# Patient Record
Sex: MALE | Race: WHITE | NOT HISPANIC OR LATINO | Employment: FULL TIME | URBAN - METROPOLITAN AREA
[De-identification: names, ages, dates, MRNs, and addresses within clinical notes are randomized per-mention and may not be internally consistent; named-entity substitution may affect disease eponyms.]

---

## 2019-01-20 ENCOUNTER — APPOINTMENT (EMERGENCY)
Dept: RADIOLOGY | Facility: HOSPITAL | Age: 54
End: 2019-01-20
Payer: COMMERCIAL

## 2019-01-20 ENCOUNTER — HOSPITAL ENCOUNTER (EMERGENCY)
Facility: HOSPITAL | Age: 54
Discharge: HOME | End: 2019-01-20
Attending: EMERGENCY MEDICINE
Payer: COMMERCIAL

## 2019-01-20 VITALS
HEART RATE: 73 BPM | TEMPERATURE: 98.2 F | WEIGHT: 216.49 LBS | RESPIRATION RATE: 18 BRPM | DIASTOLIC BLOOD PRESSURE: 72 MMHG | OXYGEN SATURATION: 96 % | SYSTOLIC BLOOD PRESSURE: 129 MMHG | BODY MASS INDEX: 29.36 KG/M2

## 2019-01-20 DIAGNOSIS — R05.9 COUGH: ICD-10-CM

## 2019-01-20 DIAGNOSIS — R06.2 WHEEZING: Primary | ICD-10-CM

## 2019-01-20 PROCEDURE — 99283 EMERGENCY DEPT VISIT LOW MDM: CPT

## 2019-01-20 PROCEDURE — 71046 X-RAY EXAM CHEST 2 VIEWS: CPT

## 2019-01-20 PROCEDURE — 94640 AIRWAY INHALATION TREATMENT: CPT

## 2019-01-20 PROCEDURE — 96374 THER/PROPH/DIAG INJ IV PUSH: CPT

## 2019-01-20 RX ORDER — LEVOTHYROXINE SODIUM 0.12 MG/1
125 TABLET ORAL DAILY
COMMUNITY

## 2019-01-20 RX ORDER — BENZONATATE 100 MG/1
100 CAPSULE ORAL ONCE
Status: COMPLETED | OUTPATIENT
Start: 2019-01-20 | End: 2019-01-20

## 2019-01-20 RX ORDER — HYDROXYCHLOROQUINE SULFATE 200 MG/1
200 TABLET, FILM COATED ORAL 2 TIMES DAILY
COMMUNITY

## 2019-01-20 RX ORDER — PREDNISONE 20 MG/1
TABLET ORAL DAILY
COMMUNITY
End: 2019-01-26

## 2019-01-20 RX ORDER — LEVOCETIRIZINE DIHYDROCHLORIDE 5 MG/1
5 TABLET, FILM COATED ORAL
COMMUNITY
Start: 2018-06-21 | End: 2019-06-21

## 2019-01-20 RX ORDER — BUDESONIDE AND FORMOTEROL FUMARATE DIHYDRATE 160; 4.5 UG/1; UG/1
2 AEROSOL RESPIRATORY (INHALATION) 2 TIMES DAILY
COMMUNITY
End: 2019-01-20

## 2019-01-20 RX ORDER — BENZONATATE 100 MG/1
100 CAPSULE ORAL EVERY 8 HOURS
Qty: 21 CAPSULE | Refills: 0 | Status: SHIPPED | OUTPATIENT
Start: 2019-01-20

## 2019-01-20 RX ORDER — METHYLPREDNISOLONE SODIUM SUCCINATE 125 MG/2ML
125 INJECTION, POWDER, LYOPHILIZED, FOR SOLUTION INTRAMUSCULAR; INTRAVENOUS ONCE
Status: COMPLETED | OUTPATIENT
Start: 2019-01-20 | End: 2019-01-20

## 2019-01-20 RX ORDER — IPRATROPIUM BROMIDE AND ALBUTEROL SULFATE 2.5; .5 MG/3ML; MG/3ML
3 SOLUTION RESPIRATORY (INHALATION) ONCE
Status: COMPLETED | OUTPATIENT
Start: 2019-01-20 | End: 2019-01-20

## 2019-01-20 RX ORDER — MONTELUKAST SODIUM 10 MG/1
10 TABLET ORAL
COMMUNITY

## 2019-01-20 RX ORDER — DOXYCYCLINE HYCLATE 100 MG/1
100 CAPSULE ORAL EVERY 12 HOURS SCHEDULED
COMMUNITY
End: 2019-01-26

## 2019-01-20 RX ORDER — ALBUTEROL SULFATE 90 UG/1
2 AEROSOL, METERED RESPIRATORY (INHALATION) EVERY 6 HOURS PRN
COMMUNITY

## 2019-01-20 RX ADMIN — IPRATROPIUM BROMIDE AND ALBUTEROL SULFATE 3 ML: 2.5; .5 SOLUTION RESPIRATORY (INHALATION) at 09:34

## 2019-01-20 RX ADMIN — BENZONATATE 100 MG: 100 CAPSULE ORAL at 09:34

## 2019-01-20 RX ADMIN — METHYLPREDNISOLONE SODIUM SUCCINATE 125 MG: 125 INJECTION, POWDER, FOR SOLUTION INTRAMUSCULAR; INTRAVENOUS at 09:38

## 2019-01-20 NOTE — ED PROVIDER NOTES
History  Chief Complaint   Patient presents with    URI     URI symptoms X4  weeks seen at urgent given antibiotics and inhaler, feels no better       History provided by:  Patient   used: No    Wheezing   Severity:  Mild  Severity compared to prior episodes:  Similar  Onset quality:  Gradual  Duration:  3 weeks  Timing:  Intermittent  Progression:  Waxing and waning  Chronicity:  New  Context comment:  Sick contacts, URI, dry heat  Relieved by:  Nothing  Worsened by:  Nothing  Ineffective treatments:  Beta-agonist inhaler  Associated symptoms: cough and sputum production    Associated symptoms: no chest pain, no chest tightness, no fatigue, no fever, no rash, no shortness of breath and no sore throat        Prior to Admission Medications   Prescriptions Last Dose Informant Patient Reported? Taking? albuterol (PROVENTIL HFA,VENTOLIN HFA) 90 mcg/act inhaler   Yes Yes   Sig: Inhale 2 puffs every 6 (six) hours as needed for wheezing   budesonide-formoterol (SYMBICORT) 160-4 5 mcg/act inhaler   Yes Yes   Sig: Inhale 2 puffs 2 (two) times a day Rinse mouth after use  doxycycline hyclate (VIBRAMYCIN) 100 mg capsule   Yes Yes   Sig: Take 100 mg by mouth every 12 (twelve) hours   hydroxychloroquine (PLAQUENIL) 200 mg tablet   Yes Yes   Sig: Take 200 mg by mouth 2 (two) times a day   levocetirizine (XYZAL) 5 MG tablet   Yes Yes   Sig: Take 5 mg by mouth daily at bedtime as needed   levothyroxine 125 mcg tablet   Yes Yes   Sig: Take 125 mcg by mouth daily   montelukast (SINGULAIR) 10 mg tablet   Yes Yes   Sig: Take 10 mg by mouth daily at bedtime   predniSONE 20 mg tablet   Yes Yes   Sig: Take by mouth daily      Facility-Administered Medications: None       Past Medical History:   Diagnosis Date    Disease of thyroid gland     graves disease       History reviewed  No pertinent surgical history  History reviewed  No pertinent family history    I have reviewed and agree with the history as documented  Social History   Substance Use Topics    Smoking status: Former Smoker    Smokeless tobacco: Never Used    Alcohol use No        Review of Systems   Constitutional: Negative for activity change, appetite change, chills, fatigue and fever  HENT: Negative for congestion, dental problem, facial swelling, sore throat, tinnitus and trouble swallowing  Eyes: Negative for pain, discharge and itching  Respiratory: Positive for cough, sputum production and wheezing  Negative for apnea, chest tightness and shortness of breath  Cardiovascular: Negative for chest pain, palpitations and leg swelling  Gastrointestinal: Negative for abdominal pain and nausea  Genitourinary: Negative for difficulty urinating, dysuria and flank pain  Musculoskeletal: Negative for arthralgias, back pain, gait problem, joint swelling and neck pain  Skin: Negative for color change, rash and wound  Neurological: Negative for dizziness and facial asymmetry  Psychiatric/Behavioral: Negative for agitation and behavioral problems  The patient is not nervous/anxious  All other systems reviewed and are negative  Physical Exam  Physical Exam   Constitutional: He is oriented to person, place, and time  He appears well-developed and well-nourished  No distress  HENT:   Head: Normocephalic and atraumatic  Right Ear: External ear normal    Left Ear: External ear normal    Eyes: Pupils are equal, round, and reactive to light  EOM are normal  Right eye exhibits no discharge  Left eye exhibits no discharge  Neck: Normal range of motion  Neck supple  No tracheal deviation present  No thyromegaly present  Cardiovascular: Normal rate and regular rhythm  No murmur heard  Pulmonary/Chest: Effort normal  He has wheezes  Abdominal: Soft  Bowel sounds are normal  He exhibits no distension  There is no tenderness  Musculoskeletal: Normal range of motion  He exhibits no edema or deformity     Neurological: He is alert and oriented to person, place, and time  No cranial nerve deficit  He exhibits normal muscle tone  Skin: Skin is warm  Capillary refill takes less than 2 seconds  He is not diaphoretic  Psychiatric: He has a normal mood and affect  His behavior is normal    Nursing note and vitals reviewed  Vital Signs  ED Triage Vitals   Temperature Pulse Respirations Blood Pressure SpO2   01/20/19 0915 01/20/19 0913 01/20/19 0913 01/20/19 0913 01/20/19 0913   98 2 °F (36 8 °C) 81 20 126/71 97 %      Temp Source Heart Rate Source Patient Position - Orthostatic VS BP Location FiO2 (%)   01/20/19 0915 01/20/19 1028 -- 01/20/19 1028 --   Oral Monitor  Right arm       Pain Score       01/20/19 0913       5           Vitals:    01/20/19 0913 01/20/19 1028   BP: 126/71 129/72   Pulse: 81 73       Visual Acuity      ED Medications  Medications   methylPREDNISolone sodium succinate (Solu-MEDROL) injection 125 mg (125 mg Intravenous Given 1/20/19 0938)   ipratropium-albuterol (DUO-NEB) 0 5-2 5 mg/3 mL inhalation solution 3 mL (3 mL Nebulization Given 1/20/19 0934)   benzonatate (TESSALON PERLES) capsule 100 mg (100 mg Oral Given 1/20/19 8911)       Diagnostic Studies  Results Reviewed     None                 XR chest 2 views   Final Result by Enedelia Varela MD (01/20 1003)      Tiny focus of disease at the left base  If the patient is to be treated for pneumonia, a follow-up two-view chest should be obtained in 6 weeks to ensure resolution of this abnormality  If the clinical scenario is not in keeping with pneumonia, further evaluation should be performed            Workstation performed: [id number]                    Procedures  Procedures       Phone Contacts  ED Phone Contact    ED Course                               MDM  Number of Diagnoses or Management Options  Cough:   Wheezing:   Diagnosis management comments: Patient with 3-4 weeks cough, wheezing    Remote history of smoking greater than 35 years ago   History of asthma, no chronic medications  Seen at urgent care 1 week ago prescribed prednisone, doxycycline, albuterol, Symbicort  Initially improved has now worsened  Continues to have cough daily, intermittently productive  Denies any fevers, chills, chest pain  Has a coal heater at home  Sick contacts at work  Vital signs stable  Patient speaking full sentences, no acute distress  Diffuse wheezing on lung exam   No focal lung abnormalities  Due to persistent cough in setting of previous smoking history in productive cough will obtain chest x-ray  Due to diffuse wheezing and cough will treat with Solu-Medrol and DuoNeb treatment  No PE risk factors  No chest pain  No cardiac workup indicated  Patient re-evaluated at 20   Feels better after treatment  Mild end-expiratory wheezing  Chest x-ray with tiny left-sided pneumonia  Fits with clinical picture  Will continue doxycycline treatment  Feel much of cough is related to wheezing  Will switch from Symbicort to Advair  Will prescribe Tessalon Perles for cough  Will continue prednisone, albuterol p r n       Discussed with patient need to follow up with primary care physician for repeat x-ray in 4-6 weeks on possible referral to pulmonologist for formal pulmonary function test        Amount and/or Complexity of Data Reviewed  Tests in the radiology section of CPT®: ordered and reviewed  Independent visualization of images, tracings, or specimens: yes    Risk of Complications, Morbidity, and/or Mortality  Presenting problems: moderate  Diagnostic procedures: moderate  Management options: moderate    Patient Progress  Patient progress: stable    CritCare Time    Disposition  Final diagnoses:   Wheezing   Cough     Time reflects when diagnosis was documented in both MDM as applicable and the Disposition within this note     Time User Action Codes Description Comment    1/20/2019 10:14 AM Scot Partida Add [R06 2] Wheezing 1/20/2019 10:14 AM Panda Garzon Add [R05] Cough       ED Disposition     ED Disposition Condition Comment    Discharge  Elmo Rough discharge to home/self care  Condition at discharge: Good        Follow-up Information     Follow up With Specialties Details Why Contact Info    Kimberly Beck MD Family Medicine In 1 week To ensure improvement, possible pulmonary referral   Need repeat chest xray in 2-3 weeks  [address]  [phone]            Discharge Medication List as of 1/20/2019 10:18 AM      START taking these medications    Details   benzonatate (TESSALON PERLES) 100 mg capsule Take 1 capsule (100 mg total) by mouth every 8 (eight) hours, Starting Sun 1/20/2019, Print      fluticasone-salmeterol (ADVAIR) 250-50 mcg/dose inhaler Inhale 1 puff 2 (two) times a day for 14 days Rinse mouth after use , Starting Sun 1/20/2019, Until Sun 2/3/2019, Print         CONTINUE these medications which have NOT CHANGED    Details   albuterol (PROVENTIL HFA,VENTOLIN HFA) 90 mcg/act inhaler Inhale 2 puffs every 6 (six) hours as needed for wheezing, Historical Med      doxycycline hyclate (VIBRAMYCIN) 100 mg capsule Take 100 mg by mouth every 12 (twelve) hours, Historical Med      hydroxychloroquine (PLAQUENIL) 200 mg tablet Take 200 mg by mouth 2 (two) times a day, Historical Med      levocetirizine (XYZAL) 5 MG tablet Take 5 mg by mouth daily at bedtime as needed, Starting Thu 6/21/2018, Until Fri 6/21/2019, Historical Med      levothyroxine 125 mcg tablet Take 125 mcg by mouth daily, Historical Med      montelukast (SINGULAIR) 10 mg tablet Take 10 mg by mouth daily at bedtime, Historical Med      predniSONE 20 mg tablet Take by mouth daily, Historical Med         STOP taking these medications       budesonide-formoterol (SYMBICORT) 160-4 5 mcg/act inhaler Comments:   Reason for Stopping:             No discharge procedures on file      ED Provider  Electronically Signed by           Jayleen Silva Vaughn Newton MD  01/20/19 621 [facility] Vaughn Newton MD  01/20/19 0739

## 2019-01-20 NOTE — DISCHARGE INSTRUCTIONS
Please continue to take prednisone as prescribed for wheezing  Please finish you prescription for doxycycline  Wheezing   WHAT YOU NEED TO KNOW:   Wheezing happens when air flows through a narrowed airway  Wheezing can happen when you breathe in, breathe out, or both  Wheezes may sound like a whistle, squeal, groan, or creak  Wheezes may also sound musical or high-pitched  Wheezing cannot be stopped by coughing  Asthma, allergies, or infection are the most common causes of wheezing  A foreign body, asthma, extra mucus, or smoking can also cause wheezing  DISCHARGE INSTRUCTIONS:   Call 911 if:   · You have sudden trouble breathing  · Your throat feels like it is swelling or feels tight  · You are dizzy, lightheaded, confused, or feel faint  · You have chest pain or tightness  Return to the emergency department if:   · You have shortness of breath  · You are coughing up blood  · You have chest pain  Contact your healthcare provider if:   · You have a fever  · Your wheezing does not get better or it gets worse  · You have questions or concerns about your condition or care  Medicines:   · Medicines  decrease inflammation, open airways, and make it easier to breathe  · Take your medicine as directed  Contact your healthcare provider if you think your medicine is not helping or if you have side effects  Tell him of her if you are allergic to any medicine  Keep a list of the medicines, vitamins, and herbs you take  Include the amounts, and when and why you take them  Bring the list or the pill bottles to follow-up visits  Carry your medicine list with you in case of an emergency  Follow up with your healthcare provider as directed: You may be referred to a specialist  Write down your questions so you remember to ask them during your visits  Manage your symptoms:   · Avoid allergy triggers , such as animals, grass, pollen, or dust     · Return to your usual activity as directed  You may need to limit certain activities until you follow up with your healthcare provider or your symptoms improve  Your child may need to avoid sports until his symptoms improve  · Take deep breaths and cough several times a day  This will decrease your risk for a lung infection and help decrease wheezing  Take a deep breath and hold it for as long as you can  Let the air out and then cough strongly  Deep breaths help open your airway  You may be given an incentive spirometer to help you take deep breaths  Put the plastic piece in your mouth and take a slow, deep breath, then let the air out and cough  Repeat these steps 10 times every hour  · Drink liquids as directed  You may need to drink more liquids than usual to thin your mucus and prevent dehydration  Ask how much liquid to drink each day and which liquids are best for you  © 2017 [address] Information is for End User's use only and may not be sold, redistributed or otherwise used for commercial purposes  All illustrations and images included in CareNotes® are the copyrighted property of [employer] M [employer]  or John Rivera  The above information is an  only  It is not intended as medical advice for individual conditions or treatments  Talk to your doctor, nurse or pharmacist before following any medical regimen to see if it is safe and effective for you

## 2019-01-25 ENCOUNTER — HOSPITAL ENCOUNTER (EMERGENCY)
Facility: HOSPITAL | Age: 54
Discharge: HOME | End: 2019-01-26
Attending: EMERGENCY MEDICINE | Admitting: EMERGENCY MEDICINE
Payer: COMMERCIAL

## 2019-01-25 DIAGNOSIS — K57.92 DIVERTICULITIS: Primary | ICD-10-CM

## 2019-01-25 LAB
BASOPHILS # BLD AUTO: 0.02 THOUSANDS/ΜL (ref 0–0.1)
BASOPHILS NFR BLD AUTO: 0 % (ref 0–1)
BILIRUB UR QL STRIP: NEGATIVE
CLARITY UR: CLEAR
COLOR UR: YELLOW
EOSINOPHIL # BLD AUTO: 0.22 THOUSAND/ΜL (ref 0–0.61)
EOSINOPHIL NFR BLD AUTO: 2 % (ref 0–6)
ERYTHROCYTE [DISTWIDTH] IN BLOOD BY AUTOMATED COUNT: 12.1 % (ref 11.6–15.1)
GLUCOSE UR STRIP-MCNC: NEGATIVE MG/DL
HCT VFR BLD AUTO: 42 % (ref 36.5–49.3)
HGB BLD-MCNC: 14 G/DL (ref 12–17)
HGB UR QL STRIP.AUTO: ABNORMAL
IMM GRANULOCYTES # BLD AUTO: 0.05 THOUSAND/UL (ref 0–0.2)
IMM GRANULOCYTES NFR BLD AUTO: 0 % (ref 0–2)
KETONES UR STRIP-MCNC: NEGATIVE MG/DL
LEUKOCYTE ESTERASE UR QL STRIP: NEGATIVE
LYMPHOCYTES # BLD AUTO: 1.33 THOUSANDS/ΜL (ref 0.6–4.47)
LYMPHOCYTES NFR BLD AUTO: 11 % (ref 14–44)
MCH RBC QN AUTO: 30.2 PG (ref 26.8–34.3)
MCHC RBC AUTO-ENTMCNC: 33.3 G/DL (ref 31.4–37.4)
MCV RBC AUTO: 91 FL (ref 82–98)
MONOCYTES # BLD AUTO: 1 THOUSAND/ΜL (ref 0.17–1.22)
MONOCYTES NFR BLD AUTO: 8 % (ref 4–12)
NEUTROPHILS # BLD AUTO: 9.49 THOUSANDS/ΜL (ref 1.85–7.62)
NEUTS SEG NFR BLD AUTO: 79 % (ref 43–75)
NITRITE UR QL STRIP: NEGATIVE
NRBC BLD AUTO-RTO: 0 /100 WBCS
PH UR STRIP.AUTO: 8.5 [PH] (ref 4.5–8)
PLATELET # BLD AUTO: 311 THOUSANDS/UL (ref 149–390)
PMV BLD AUTO: 9.2 FL (ref 8.9–12.7)
PROT UR STRIP-MCNC: ABNORMAL MG/DL
RBC # BLD AUTO: 4.64 MILLION/UL (ref 3.88–5.62)
SP GR UR STRIP.AUTO: 1.01 (ref 1–1.03)
UROBILINOGEN UR QL STRIP.AUTO: 0.2 E.U./DL
WBC # BLD AUTO: 12.11 THOUSAND/UL (ref 4.31–10.16)

## 2019-01-25 PROCEDURE — 85025 COMPLETE CBC W/AUTO DIFF WBC: CPT | Performed by: EMERGENCY MEDICINE

## 2019-01-25 PROCEDURE — 83690 ASSAY OF LIPASE: CPT | Performed by: EMERGENCY MEDICINE

## 2019-01-25 PROCEDURE — 81001 URINALYSIS AUTO W/SCOPE: CPT

## 2019-01-25 PROCEDURE — 83605 ASSAY OF LACTIC ACID: CPT | Performed by: EMERGENCY MEDICINE

## 2019-01-25 PROCEDURE — 80053 COMPREHEN METABOLIC PANEL: CPT | Performed by: EMERGENCY MEDICINE

## 2019-01-25 PROCEDURE — 36415 COLL VENOUS BLD VENIPUNCTURE: CPT | Performed by: EMERGENCY MEDICINE

## 2019-01-25 PROCEDURE — 99284 EMERGENCY DEPT VISIT MOD MDM: CPT

## 2019-01-25 RX ORDER — ACETAMINOPHEN 325 MG/1
650 TABLET ORAL ONCE
Status: COMPLETED | OUTPATIENT
Start: 2019-01-26 | End: 2019-01-26

## 2019-01-26 ENCOUNTER — APPOINTMENT (EMERGENCY)
Dept: CT IMAGING | Facility: HOSPITAL | Age: 54
End: 2019-01-26
Payer: COMMERCIAL

## 2019-01-26 VITALS
WEIGHT: 202.82 LBS | HEART RATE: 67 BPM | OXYGEN SATURATION: 95 % | SYSTOLIC BLOOD PRESSURE: 112 MMHG | RESPIRATION RATE: 18 BRPM | TEMPERATURE: 100.4 F | BODY MASS INDEX: 27.51 KG/M2 | DIASTOLIC BLOOD PRESSURE: 61 MMHG

## 2019-01-26 LAB
ALBUMIN SERPL BCP-MCNC: 3.2 G/DL (ref 3.5–5)
ALP SERPL-CCNC: 67 U/L (ref 46–116)
ALT SERPL W P-5'-P-CCNC: 24 U/L (ref 12–78)
ANION GAP SERPL CALCULATED.3IONS-SCNC: 7 MMOL/L (ref 4–13)
AST SERPL W P-5'-P-CCNC: 16 U/L (ref 5–45)
BACTERIA UR QL AUTO: ABNORMAL /HPF
BILIRUB SERPL-MCNC: 0.5 MG/DL (ref 0.2–1)
BUN SERPL-MCNC: 14 MG/DL (ref 5–25)
CALCIUM SERPL-MCNC: 8.7 MG/DL (ref 8.3–10.1)
CHLORIDE SERPL-SCNC: 101 MMOL/L (ref 100–108)
CO2 SERPL-SCNC: 30 MMOL/L (ref 21–32)
CREAT SERPL-MCNC: 1.02 MG/DL (ref 0.6–1.3)
GFR SERPL CREATININE-BSD FRML MDRD: 82 ML/MIN/1.73SQ M
GLUCOSE SERPL-MCNC: 123 MG/DL (ref 65–140)
LACTATE SERPL-SCNC: 0.7 MMOL/L (ref 0.5–2)
LIPASE SERPL-CCNC: 181 U/L (ref 73–393)
MUCOUS THREADS UR QL AUTO: ABNORMAL
NON-SQ EPI CELLS URNS QL MICRO: ABNORMAL /HPF
POTASSIUM SERPL-SCNC: 3.6 MMOL/L (ref 3.5–5.3)
PROT SERPL-MCNC: 7.3 G/DL (ref 6.4–8.2)
RBC #/AREA URNS AUTO: ABNORMAL /HPF
SODIUM SERPL-SCNC: 138 MMOL/L (ref 136–145)
WBC #/AREA URNS AUTO: ABNORMAL /HPF

## 2019-01-26 PROCEDURE — 74177 CT ABD & PELVIS W/CONTRAST: CPT

## 2019-01-26 PROCEDURE — 96361 HYDRATE IV INFUSION ADD-ON: CPT

## 2019-01-26 PROCEDURE — 96375 TX/PRO/DX INJ NEW DRUG ADDON: CPT

## 2019-01-26 PROCEDURE — 96374 THER/PROPH/DIAG INJ IV PUSH: CPT

## 2019-01-26 RX ORDER — OXYCODONE HYDROCHLORIDE AND ACETAMINOPHEN 5; 325 MG/1; MG/1
1 TABLET ORAL ONCE
Status: COMPLETED | OUTPATIENT
Start: 2019-01-26 | End: 2019-01-26

## 2019-01-26 RX ORDER — CIPROFLOXACIN 500 MG/1
500 TABLET, FILM COATED ORAL ONCE
Status: COMPLETED | OUTPATIENT
Start: 2019-01-26 | End: 2019-01-26

## 2019-01-26 RX ORDER — METRONIDAZOLE 500 MG/1
500 TABLET ORAL EVERY 8 HOURS SCHEDULED
Qty: 30 TABLET | Refills: 0 | Status: SHIPPED | OUTPATIENT
Start: 2019-01-26 | End: 2019-02-05

## 2019-01-26 RX ORDER — METRONIDAZOLE 500 MG/1
500 TABLET ORAL ONCE
Status: COMPLETED | OUTPATIENT
Start: 2019-01-26 | End: 2019-01-26

## 2019-01-26 RX ORDER — OXYCODONE HYDROCHLORIDE AND ACETAMINOPHEN 5; 325 MG/1; MG/1
1 TABLET ORAL EVERY 4 HOURS PRN
Qty: 20 TABLET | Refills: 0 | Status: SHIPPED | OUTPATIENT
Start: 2019-01-26

## 2019-01-26 RX ORDER — CIPROFLOXACIN 500 MG/1
500 TABLET, FILM COATED ORAL 2 TIMES DAILY
Qty: 20 TABLET | Refills: 0 | Status: SHIPPED | OUTPATIENT
Start: 2019-01-26 | End: 2019-02-05

## 2019-01-26 RX ORDER — HYDROMORPHONE HCL/PF 1 MG/ML
1 SYRINGE (ML) INJECTION ONCE
Status: COMPLETED | OUTPATIENT
Start: 2019-01-26 | End: 2019-01-26

## 2019-01-26 RX ORDER — ONDANSETRON 2 MG/ML
4 INJECTION INTRAMUSCULAR; INTRAVENOUS ONCE
Status: COMPLETED | OUTPATIENT
Start: 2019-01-26 | End: 2019-01-26

## 2019-01-26 RX ADMIN — ACETAMINOPHEN 650 MG: 325 TABLET, FILM COATED ORAL at 00:16

## 2019-01-26 RX ADMIN — CIPROFLOXACIN HYDROCHLORIDE 500 MG: 500 TABLET, FILM COATED ORAL at 03:13

## 2019-01-26 RX ADMIN — METRONIDAZOLE 500 MG: 500 TABLET ORAL at 03:13

## 2019-01-26 RX ADMIN — HYDROMORPHONE HYDROCHLORIDE 1 MG: 1 INJECTION, SOLUTION INTRAMUSCULAR; INTRAVENOUS; SUBCUTANEOUS at 01:20

## 2019-01-26 RX ADMIN — SODIUM CHLORIDE 1000 ML: 0.9 INJECTION, SOLUTION INTRAVENOUS at 00:55

## 2019-01-26 RX ADMIN — OXYCODONE HYDROCHLORIDE AND ACETAMINOPHEN 1 TABLET: 5; 325 TABLET ORAL at 03:13

## 2019-01-26 RX ADMIN — IOHEXOL 100 ML: 350 INJECTION, SOLUTION INTRAVENOUS at 02:05

## 2019-01-26 RX ADMIN — ONDANSETRON 4 MG: 2 INJECTION INTRAMUSCULAR; INTRAVENOUS at 01:17

## 2019-01-26 NOTE — DISCHARGE INSTRUCTIONS
Take ciprofloxacin and metronidazole antibiotics as prescribed for the next 10 days  You may take 1 tablet of oxycodone/acetaminophen every 6 hours as needed for pain  Follow a liquid diet for the next 1 day  Then move on to a low-fiber diet for the remainder of the days you take antibiotics  Restart a high-fiber diet after antibiotics are completed and your feeling well  Schedule follow-up appointment with your primary care physician in the next week and with your gastroenterologist for follow-up as well  Return to the emergency department for any worsening/new concerns  Diverticulitis   WHAT YOU NEED TO KNOW:   Diverticulitis is a condition that causes small pockets along your intestine called diverticula to become inflamed or infected  This is caused by hard bowel movements, food, or bacteria that get stuck in the pockets  DISCHARGE INSTRUCTIONS:   Seek care immediately if:   · You have bowel movement or foul-smelling discharge leaking from your vagina or in your urine  · You have severe diarrhea  · You urinate less than usual or not at all  · You are not able to have a bowel movement  · You cannot stop vomiting  · You have severe abdominal pain, a fever, and your abdomen is larger than usual      · You have new or increased blood in your bowel movements  Contact your healthcare provider if:   · You have pain when you urinate  · Your symptoms get worse or do not go away  · You have questions or concerns about your condition or care  Medicines:   · Antibiotics  may be given to help prevent or treat a bacterial infection  · Prescription pain medicine  may be given  Ask your healthcare provider how to take this medicine safely  Some prescription pain medicines contain acetaminophen  Do not take other medicines that contain acetaminophen without talking to your healthcare provider  Too much acetaminophen may cause liver damage   Prescription pain medicine may cause constipation  Ask your healthcare provider how to prevent or treat constipation  · Take your medicine as directed  Contact your healthcare provider if you think your medicine is not helping or if you have side effects  Tell him or her if you are allergic to any medicine  Keep a list of the medicines, vitamins, and herbs you take  Include the amounts, and when and why you take them  Bring the list or the pill bottles to follow-up visits  Carry your medicine list with you in case of an emergency  Clear liquid diet:  A clear liquid diet includes any liquids that you can see through  Examples include water, ginger-beth, cranberry or apple juice, frozen fruit ice, or broth  Stay on a clear liquid diet until your symptoms are gone, or as directed  Follow up with your healthcare provider as directed: You may need to return for a colonoscopy  When your symptoms are gone, you may need a low-fat, high-fiber diet to help prevent diverticulitis from developing again  Your healthcare provider or dietitian can help you create meal plans  Write down your questions so you remember to ask them during your visits  © 2017 [address] [employer] Information is for End User's use only and may not be sold, redistributed or otherwise used for commercial purposes  All illustrations and images included in CareNotes® are the copyrighted property of A D [employer] M , Inc  or John Rivera  The above information is an  only  It is not intended as medical advice for individual conditions or treatments  Talk to your doctor, nurse or pharmacist before following any medical regimen to see if it is safe and effective for you  Diverticulitis   WHAT YOU NEED TO KNOW:   Diverticulitis is a condition that causes small pockets along your intestine called diverticula to become inflamed or infected  This is caused by hard bowel movements, food, or bacteria that get stuck in the pockets         DISCHARGE INSTRUCTIONS: Seek care immediately if:   · You have bowel movement or foul-smelling discharge leaking from your vagina or in your urine  · You have severe diarrhea  · You urinate less than usual or not at all  · You are not able to have a bowel movement  · You cannot stop vomiting  · You have severe abdominal pain, a fever, and your abdomen is larger than usual      · You have new or increased blood in your bowel movements  Contact your healthcare provider if:   · You have pain when you urinate  · Your symptoms get worse or do not go away  · You have questions or concerns about your condition or care  Medicines:   · Antibiotics  may be given to help prevent or treat a bacterial infection  · Prescription pain medicine  may be given  Ask your healthcare provider how to take this medicine safely  Some prescription pain medicines contain acetaminophen  Do not take other medicines that contain acetaminophen without talking to your healthcare provider  Too much acetaminophen may cause liver damage  Prescription pain medicine may cause constipation  Ask your healthcare provider how to prevent or treat constipation  · Take your medicine as directed  Contact your healthcare provider if you think your medicine is not helping or if you have side effects  Tell him or her if you are allergic to any medicine  Keep a list of the medicines, vitamins, and herbs you take  Include the amounts, and when and why you take them  Bring the list or the pill bottles to follow-up visits  Carry your medicine list with you in case of an emergency  Clear liquid diet:  A clear liquid diet includes any liquids that you can see through  Examples include water, ginger-beth, cranberry or apple juice, frozen fruit ice, or broth  Stay on a clear liquid diet until your symptoms are gone, or as directed  Follow up with your healthcare provider as directed: You may need to return for a colonoscopy   When your symptoms are gone, you may need a low-fat, high-fiber diet to help prevent diverticulitis from developing again  Your healthcare provider or dietitian can help you create meal plans  Write down your questions so you remember to ask them during your visits  © 2017 [address] Information is for End User's use only and may not be sold, redistributed or otherwise used for commercial purposes  All illustrations and images included in CareNotes® are the copyrighted property of [employer]  or John Rivera  The above information is an  only  It is not intended as medical advice for individual conditions or treatments  Talk to your doctor, nurse or pharmacist before following any medical regimen to see if it is safe and effective for you

## 2019-01-26 NOTE — ED PROVIDER NOTES
History  Chief Complaint   Patient presents with    Abdominal Pain     Pt c/o LLQ sharp abdominal pain that started yesturday  Pt c/o pain with urination  Pt has a decrease in appetite  Pt had milk of mg  Pt reports not going regularly but is passing gas  Patient is a 56-year-old male who presents to the emergency department for evaluation with left lower abdominal pain  Onset approximately 1 week ago without identified provocation  He describes it as a terrible pain    He relates that initially that this was manageable    Several hours ago he had take4  Advil which provided him with a fair amount of relief  At this point however pain is dramatically worsened  Today his appetite has been diminished  Last night he went to bed early as he has been feeling more fatigued  He denies awareness of having had any fevers  He has not had nausea or vomiting  Urination has been slightly decreased and he notes increased discomfort in the lower abdomen upon passing this  He has not appreciated hematuria or malodor to the urine  He denies any history of prior abdominal problems  No history of diverticulitis  He does have rheumatoid arthritis and over the last several weeks been dealing with bronchitis  He completed a course of doxycycline for this 2 days ago  Prior to Admission Medications   Prescriptions Last Dose Informant Patient Reported? Taking?    Omeprazole (PRILOSEC PO)   Yes No   Sig: Take 20 mg by mouth daily   albuterol (PROVENTIL HFA,VENTOLIN HFA) 90 mcg/act inhaler   Yes Yes   Sig: Inhale 2 puffs every 6 (six) hours as needed for wheezing   benzonatate (TESSALON PERLES) 100 mg capsule   No Yes   Sig: Take 1 capsule (100 mg total) by mouth every 8 (eight) hours   hydroxychloroquine (PLAQUENIL) 200 mg tablet   Yes Yes   Sig: Take 200 mg by mouth 2 (two) times a day   levocetirizine (XYZAL) 5 MG tablet   Yes Yes   Sig: Take 5 mg by mouth daily at bedtime as needed   levothyroxine 125 mcg tablet   Yes Yes   Sig: Take 125 mcg by mouth daily   montelukast (SINGULAIR) 10 mg tablet   Yes Yes   Sig: Take 10 mg by mouth daily at bedtime      Facility-Administered Medications: None       Past Medical History:   Diagnosis Date    Disease of thyroid gland     graves disease    Rheumatoid arthritis ([address] )        Past Surgical History:   Procedure Laterality Date    KNEE SURGERY      SHOULDER SURGERY         History reviewed  No pertinent family history  I have reviewed and agree with the history as documented  Social History   Substance Use Topics    Smoking status: Former Smoker    Smokeless tobacco: Never Used    Alcohol use 8 4 oz/week     14 Cans of beer per week      Comment: Hasnt drank in 2 weeks because of feeling sick        Review of Systems   All other systems reviewed and are negative  Physical Exam  Physical Exam   Constitutional: He is oriented to person, place, and time  He appears well-developed and well-nourished  He appears distressed (Very uncomfortable appearing upon movement )  HENT:   Head: Normocephalic  Eyes: Conjunctivae and EOM are normal    Cardiovascular: Normal rate and regular rhythm  Pulmonary/Chest: Effort normal  He has no rales  Inspiratory and expiratory wheezes throughout  Abdominal: Soft  Bowel sounds are normal    Diffuse tenderness-maximal with guarding in the left lower abdomen  No CVA tenderness is present  Musculoskeletal: Normal range of motion  He exhibits no edema or tenderness  Neurological: He is alert and oriented to person, place, and time  Skin: Skin is warm and dry  Psychiatric: He has a normal mood and affect  His behavior is normal    Nursing note and vitals reviewed        Vital Signs  ED Triage Vitals   Temperature Pulse Respirations Blood Pressure SpO2   01/25/19 2315 01/25/19 2313 01/25/19 2313 01/25/19 2313 01/25/19 2313   100 4 °F (38 °C) 85 20 135/69 94 %      Temp Source Heart Rate Source Patient Position - Orthostatic VS BP Location FiO2 (%)   01/25/19 2315 01/25/19 2313 01/25/19 2313 01/25/19 2313 --   Oral Monitor Sitting Right arm       Pain Score       01/25/19 2313       8           Vitals:    01/25/19 2313 01/26/19 0116 01/26/19 0316   BP: 135/69 126/66 112/61   Pulse: 85 73 67   Patient Position - Orthostatic VS: Sitting Sitting Sitting       Visual Acuity      ED Medications  Medications   acetaminophen (TYLENOL) tablet 650 mg (650 mg Oral Given 1/26/19 0016)   sodium chloride 0 9 % bolus 1,000 mL (0 mL Intravenous Stopped 1/26/19 0313)   ondansetron (ZOFRAN) injection 4 mg (4 mg Intravenous Given 1/26/19 0117)   HYDROmorphone (DILAUDID) injection 1 mg (1 mg Intravenous Given 1/26/19 0120)   iohexol (OMNIPAQUE) 350 MG/ML injection (MULTI-DOSE) 100 mL (100 mL Intravenous Given 1/26/19 0205)   ciprofloxacin (CIPRO) tablet 500 mg (500 mg Oral Given 1/26/19 0313)   metroNIDAZOLE (FLAGYL) tablet 500 mg (500 mg Oral Given 1/26/19 0313)   oxyCODONE-acetaminophen (PERCOCET) 5-325 mg per tablet 1 tablet (1 tablet Oral Given 1/26/19 0313)       Diagnostic Studies  Results Reviewed     Procedure Component Value Units Date/Time    Lactic acid, plasma [[id number]]  (Normal) Collected:  01/25/19 2345    Lab Status:  Final result Specimen:  Blood from Arm, Right Updated:  01/26/19 0123     LACTIC ACID 0 7 mmol/L     Narrative:         Result may be elevated if tourniquet was used during collection      Comprehensive metabolic panel [[id number]]  (Abnormal) Collected:  01/25/19 2345    Lab Status:  Final result Specimen:  Blood from Arm, Right Updated:  01/26/19 0009     Sodium 138 mmol/L      Potassium 3 6 mmol/L      Chloride 101 mmol/L      CO2 30 mmol/L      ANION GAP 7 mmol/L      BUN 14 mg/dL      Creatinine 1 02 mg/dL      Glucose 123 mg/dL      Calcium 8 7 mg/dL      AST 16 U/L      ALT 24 U/L      Alkaline Phosphatase 67 U/L      Total Protein 7 3 g/dL      Albumin 3 2 (L) g/dL      Total Bilirubin 0 50 mg/dL      eGFR 80 ml/min/1 73sq m     Narrative:         National Kidney Disease Education Program recommendations are as follows:  GFR calculation is accurate only with a steady state creatinine  Chronic Kidney disease less than 60 ml/min/1 73 sq  meters  Kidney failure less than 15 ml/min/1 73 sq  meters      Lipase [[id number]]  (Normal) Collected:  01/25/19 2345    Lab Status:  Final result Specimen:  Blood from Arm, Right Updated:  01/26/19 0009     Lipase 181 u/L     Urine Microscopic [[id number]]  (Abnormal) Collected:  01/25/19 2344    Lab Status:  Final result Specimen:  Urine from Urine, Clean Catch Updated:  01/26/19 0006     RBC, UA 1-2 (A) /hpf      WBC, UA 0-1 (A) /hpf      Epithelial Cells Occasional /hpf      Bacteria, UA Occasional /hpf      MUCUS THREADS Moderate (A)    CBC and differential [[id number]]  (Abnormal) Collected:  01/25/19 2345    Lab Status:  Final result Specimen:  Blood from Arm, Right Updated:  01/25/19 2353     WBC 12 11 (H) Thousand/uL      RBC 4 64 Million/uL      Hemoglobin 14 0 g/dL      Hematocrit 42 0 %      MCV 91 fL      MCH 30 2 pg      MCHC 33 3 g/dL      RDW 12 1 %      MPV 9 2 fL      Platelets 995 Thousands/uL      nRBC 0 /100 WBCs      Neutrophils Relative 79 (H) %      Immat GRANS % 0 %      Lymphocytes Relative 11 (L) %      Monocytes Relative 8 %      Eosinophils Relative 2 %      Basophils Relative 0 %      Neutrophils Absolute 9 49 (H) Thousands/µL      Immature Grans Absolute 0 05 Thousand/uL      Lymphocytes Absolute 1 33 Thousands/µL      Monocytes Absolute 1 00 Thousand/µL      Eosinophils Absolute 0 22 Thousand/µL      Basophils Absolute 0 02 Thousands/µL     ED Urine Macroscopic [[id number]]  (Abnormal) Collected:  01/25/19 2344    Lab Status:  Final result Specimen:  Urine Updated:  01/25/19 2343     Color, UA Yellow     Clarity, UA Clear     pH, UA 8 5 (H)     Leukocytes, UA Negative     Nitrite, UA Negative     Protein,  (2+) (A) mg/dl      Glucose, UA Negative mg/dl Ketones, UA Negative mg/dl      Urobilinogen, UA 0 2 E U /dl      Bilirubin, UA Negative     Blood, UA Trace (A)     Specific [place], UA 1 015    Narrative:       CLINITEK RESULT                 CT abdomen pelvis with contrast   Final Result by Nidia Fajardo DO (01/26 0217)      Colonic diverticulosis with stranding in the left lower quadrant consistent with acute diverticulitis  No drainable fluid collection  Recommend posttreatment colonoscopy to rule out underlying neoplasm  Airspace opacities within the lung bases which may represent infectious etiology  Follow-up to resolution is recommended  The study was marked in [facility] for immediate notification  Workstation performed: [id number]                    Procedures  Procedures       Phone Contacts  ED Phone Contact    ED Course                               MDM  Number of Diagnoses or Management Options  Diverticulitis:   Diagnosis management comments: Patient had improvement in discomfort following hydromorphone  Study results were reviewed with him and his family  Fortunately no perforation or abscess was identified along with the diverticulitis  We discussed options IV antibiotics and brief stay in the hospital until significantly improved or trying oral antibiotics with oral analgesics at home and a need to return p r n  Worsening  Patient preferred to try oral antibiotics with discharge  He has taken Percocet in the past following surgeries  He has tolerated this well and a short prescription will be provided  ( aware was reviewed)  We discussed diet modification including fluids for the next day, low residue diet while on antibiotics and high-fiber diet after completion of antibiotics and resolution of pain  Patient is already been under the care of his PCP for recent lung issues  He will be following up with them for same  Additionally encouraged follow-up in the next week for the diverticulitis    Patient aware to return to the emergency department for worsening/new concerns  Additionally encouraged to follow up with his GI specialist whom he had had a colonoscopy last year  All questions answered  CritCare Time    Disposition  Final diagnoses:   Diverticulitis     Time reflects when diagnosis was documented in both MDM as applicable and the Disposition within this note     Time User Action Codes Description Comment    1/26/2019  2:56 AM Vicenta ROJAS Add [K57 92] Diverticulitis       ED Disposition     ED Disposition Condition Comment    Discharge  Loli Gasca discharge to home/self care      Condition at discharge: Good        Follow-up Information     Follow up With Specialties Details Why Contact Info Additional Information    Yary Hough MD Family Medicine Schedule an appointment as soon as possible for a visit For re-evaluation in 5 to 7 days [place] [id number] [facility]       Abby Lloyd MD Gastroenterology Schedule an appointment as soon as possible for a visit  [address] [place] [address] Emergency Department Emergency Medicine Go to As needed, If symptoms worsen [address] [facility]  AN ED, [address], [place], [place], [address]          Discharge Medication List as of 1/26/2019  3:02 AM      START taking these medications    Details   ciprofloxacin (CIPRO) 500 mg tablet Take 1 tablet (500 mg total) by mouth 2 (two) times a day for 10 days, Starting Sat 1/26/2019, Until Tue 2/5/2019, Print      metroNIDAZOLE (FLAGYL) 500 mg tablet Take 1 tablet (500 mg total) by mouth every 8 (eight) hours for 10 days, Starting Sat 1/26/2019, Until Tue 2/5/2019, Print      oxyCODONE-acetaminophen (PERCOCET) 5-325 mg per tablet Take 1 tablet by mouth every 4 (four) hours as needed for moderate pain Max Daily Amount: 6 tablets, Starting Sat 1/26/2019, Print         CONTINUE these medications which have NOT CHANGED    Details   albuterol (PROVENTIL HFA,VENTOLIN HFA) 90 mcg/act inhaler Inhale 2 puffs every 6 (six) hours as needed for wheezing, Historical Med      benzonatate (TESSALON PERLES) 100 mg capsule Take 1 capsule (100 mg total) by mouth every 8 (eight) hours, Starting Sun 1/20/2019, Print      hydroxychloroquine (PLAQUENIL) 200 mg tablet Take 200 mg by mouth 2 (two) times a day, Historical Med      levocetirizine (XYZAL) 5 MG tablet Take 5 mg by mouth daily at bedtime as needed, Starting Thu 6/21/2018, Until Fri 6/21/2019, Historical Med      levothyroxine 125 mcg tablet Take 125 mcg by mouth daily, Historical Med      montelukast (SINGULAIR) 10 mg tablet Take 10 mg by mouth daily at bedtime, Historical Med      Omeprazole (PRILOSEC PO) Take 20 mg by mouth daily, Historical Med           No discharge procedures on file      ED Provider  Electronically Signed by           Debbie Kirk MD  01/27/19 1121

## 2019-01-26 NOTE — ED NOTES
Pt is alert and oriented  Pt VSS  Pt ambulated safely with a steady gait off of unit  Pt son is driving him home  Pt knows he is not to drive on medications        Michael Pendleton RN  01/26/19 4214

## 2020-09-06 ENCOUNTER — HOSPITAL ENCOUNTER (EMERGENCY)
Facility: HOSPITAL | Age: 55
Discharge: HOME | End: 2020-09-06
Attending: EMERGENCY MEDICINE | Admitting: EMERGENCY MEDICINE
Payer: COMMERCIAL

## 2020-09-06 ENCOUNTER — APPOINTMENT (EMERGENCY)
Dept: CT IMAGING | Facility: HOSPITAL | Age: 55
End: 2020-09-06
Payer: COMMERCIAL

## 2020-09-06 VITALS
WEIGHT: 190.26 LBS | BODY MASS INDEX: 25.8 KG/M2 | TEMPERATURE: 98.3 F | RESPIRATION RATE: 18 BRPM | DIASTOLIC BLOOD PRESSURE: 66 MMHG | OXYGEN SATURATION: 100 % | HEART RATE: 70 BPM | SYSTOLIC BLOOD PRESSURE: 118 MMHG

## 2020-09-06 DIAGNOSIS — K57.92 DIVERTICULITIS: Primary | ICD-10-CM

## 2020-09-06 LAB
ALBUMIN SERPL BCP-MCNC: 3.7 G/DL (ref 3.5–5)
ALP SERPL-CCNC: 61 U/L (ref 46–116)
ALT SERPL W P-5'-P-CCNC: 16 U/L (ref 12–78)
ANION GAP SERPL CALCULATED.3IONS-SCNC: 8 MMOL/L (ref 4–13)
AST SERPL W P-5'-P-CCNC: 12 U/L (ref 5–45)
BASOPHILS # BLD AUTO: 0.07 THOUSANDS/ΜL (ref 0–0.1)
BASOPHILS NFR BLD AUTO: 1 % (ref 0–1)
BILIRUB SERPL-MCNC: 0.49 MG/DL (ref 0.2–1)
BILIRUB UR QL STRIP: NEGATIVE
BUN SERPL-MCNC: 16 MG/DL (ref 5–25)
CALCIUM SERPL-MCNC: 8.8 MG/DL (ref 8.3–10.1)
CHLORIDE SERPL-SCNC: 102 MMOL/L (ref 100–108)
CLARITY UR: CLEAR
CO2 SERPL-SCNC: 29 MMOL/L (ref 21–32)
COLOR UR: YELLOW
CREAT SERPL-MCNC: 1.04 MG/DL (ref 0.6–1.3)
EOSINOPHIL # BLD AUTO: 0.11 THOUSAND/ΜL (ref 0–0.61)
EOSINOPHIL NFR BLD AUTO: 1 % (ref 0–6)
ERYTHROCYTE [DISTWIDTH] IN BLOOD BY AUTOMATED COUNT: 12 % (ref 11.6–15.1)
GFR SERPL CREATININE-BSD FRML MDRD: 80 ML/MIN/1.73SQ M
GLUCOSE SERPL-MCNC: 136 MG/DL (ref 65–140)
GLUCOSE UR STRIP-MCNC: NEGATIVE MG/DL
HCT VFR BLD AUTO: 43.2 % (ref 36.5–49.3)
HGB BLD-MCNC: 14.3 G/DL (ref 12–17)
HGB UR QL STRIP.AUTO: NEGATIVE
IMM GRANULOCYTES # BLD AUTO: 0.03 THOUSAND/UL (ref 0–0.2)
IMM GRANULOCYTES NFR BLD AUTO: 0 % (ref 0–2)
KETONES UR STRIP-MCNC: NEGATIVE MG/DL
LEUKOCYTE ESTERASE UR QL STRIP: NEGATIVE
LIPASE SERPL-CCNC: 146 U/L (ref 73–393)
LYMPHOCYTES # BLD AUTO: 0.92 THOUSANDS/ΜL (ref 0.6–4.47)
LYMPHOCYTES NFR BLD AUTO: 9 % (ref 14–44)
MCH RBC QN AUTO: 31 PG (ref 26.8–34.3)
MCHC RBC AUTO-ENTMCNC: 33.1 G/DL (ref 31.4–37.4)
MCV RBC AUTO: 94 FL (ref 82–98)
MONOCYTES # BLD AUTO: 0.89 THOUSAND/ΜL (ref 0.17–1.22)
MONOCYTES NFR BLD AUTO: 9 % (ref 4–12)
NEUTROPHILS # BLD AUTO: 8.15 THOUSANDS/ΜL (ref 1.85–7.62)
NEUTS SEG NFR BLD AUTO: 80 % (ref 43–75)
NITRITE UR QL STRIP: NEGATIVE
NRBC BLD AUTO-RTO: 0 /100 WBCS
PH UR STRIP.AUTO: 7.5 [PH] (ref 4.5–8)
PLATELET # BLD AUTO: 315 THOUSANDS/UL (ref 149–390)
PMV BLD AUTO: 9.5 FL (ref 8.9–12.7)
POTASSIUM SERPL-SCNC: 4.4 MMOL/L (ref 3.5–5.3)
PROT SERPL-MCNC: 7.4 G/DL (ref 6.4–8.2)
PROT UR STRIP-MCNC: NEGATIVE MG/DL
RBC # BLD AUTO: 4.61 MILLION/UL (ref 3.88–5.62)
SODIUM SERPL-SCNC: 139 MMOL/L (ref 136–145)
SP GR UR STRIP.AUTO: 1.02 (ref 1–1.03)
UROBILINOGEN UR QL STRIP.AUTO: 0.2 E.U./DL
WBC # BLD AUTO: 10.17 THOUSAND/UL (ref 4.31–10.16)

## 2020-09-06 PROCEDURE — 96361 HYDRATE IV INFUSION ADD-ON: CPT

## 2020-09-06 PROCEDURE — 85025 COMPLETE CBC W/AUTO DIFF WBC: CPT

## 2020-09-06 PROCEDURE — 96375 TX/PRO/DX INJ NEW DRUG ADDON: CPT

## 2020-09-06 PROCEDURE — 81003 URINALYSIS AUTO W/O SCOPE: CPT

## 2020-09-06 PROCEDURE — 96374 THER/PROPH/DIAG INJ IV PUSH: CPT

## 2020-09-06 PROCEDURE — 74177 CT ABD & PELVIS W/CONTRAST: CPT

## 2020-09-06 PROCEDURE — 80053 COMPREHEN METABOLIC PANEL: CPT

## 2020-09-06 PROCEDURE — 36415 COLL VENOUS BLD VENIPUNCTURE: CPT

## 2020-09-06 PROCEDURE — 83690 ASSAY OF LIPASE: CPT

## 2020-09-06 PROCEDURE — 99284 EMERGENCY DEPT VISIT MOD MDM: CPT

## 2020-09-06 PROCEDURE — 99284 EMERGENCY DEPT VISIT MOD MDM: CPT | Performed by: PHYSICIAN ASSISTANT

## 2020-09-06 PROCEDURE — G1004 CDSM NDSC: HCPCS

## 2020-09-06 RX ORDER — MORPHINE SULFATE 4 MG/ML
4 INJECTION, SOLUTION INTRAMUSCULAR; INTRAVENOUS ONCE
Status: COMPLETED | OUTPATIENT
Start: 2020-09-06 | End: 2020-09-06

## 2020-09-06 RX ORDER — ONDANSETRON 4 MG/1
4 TABLET, ORALLY DISINTEGRATING ORAL EVERY 8 HOURS PRN
Qty: 15 TABLET | Refills: 0 | Status: SHIPPED | OUTPATIENT
Start: 2020-09-06

## 2020-09-06 RX ORDER — CIPROFLOXACIN 500 MG/1
500 TABLET, FILM COATED ORAL 2 TIMES DAILY
Qty: 20 TABLET | Refills: 0 | Status: SHIPPED | OUTPATIENT
Start: 2020-09-06 | End: 2020-09-16

## 2020-09-06 RX ORDER — ONDANSETRON 2 MG/ML
4 INJECTION INTRAMUSCULAR; INTRAVENOUS ONCE
Status: COMPLETED | OUTPATIENT
Start: 2020-09-06 | End: 2020-09-06

## 2020-09-06 RX ORDER — METRONIDAZOLE 500 MG/1
500 TABLET ORAL 3 TIMES DAILY
Qty: 30 TABLET | Refills: 0 | Status: SHIPPED | OUTPATIENT
Start: 2020-09-06 | End: 2020-09-16

## 2020-09-06 RX ADMIN — MORPHINE SULFATE 4 MG: 4 INJECTION INTRAVENOUS at 12:33

## 2020-09-06 RX ADMIN — IOHEXOL 100 ML: 350 INJECTION, SOLUTION INTRAVENOUS at 11:31

## 2020-09-06 RX ADMIN — ONDANSETRON 4 MG: 2 INJECTION INTRAMUSCULAR; INTRAVENOUS at 10:24

## 2020-09-06 RX ADMIN — SODIUM CHLORIDE 1000 ML: 0.9 INJECTION, SOLUTION INTRAVENOUS at 10:23

## 2020-09-06 NOTE — ED NOTES
Aware awaiting ct scan, call bell within reach, bed low position, IVF infusing     Cyrus Rojas, RN  09/06/20 [id number]

## 2020-09-06 NOTE — ED NOTES
Returned from ct, call bell within reach, bed low position, IVF infusing     Eugene Real RN  09/06/20 Evaristo Moreno, RN  09/06/20 [id number]

## 2020-09-06 NOTE — ED PROVIDER NOTES
History  Chief Complaint   Patient presents with    Abdominal Pain     per pt "he has a Hx  of diverticulitis, and has been having some lower abdominal pain with with some diarrhea and nausea after brealfast this morning "     58-year-old male presents the emergency department with complaints of abdominal pain  States that he started with some sharp and cramping left-sided abdominal pain approximately 1 week ago  Notes that symptoms have been waxing waning but have overall been worsening since that time  No fevers at home  Reports some intermittent constipation  Denies change in color of his stool  States that he has had some nausea earlier today without vomiting at home  History of diverticulitis approximately 1 year ago  History provided by:  Patient   used: No    Abdominal Pain   Pain location:  LLQ  Pain quality: cramping and sharp    Pain radiates to:  RLQ and back  Pain severity:  Moderate  Onset quality:  Gradual  Duration:  1 week  Timing:  Intermittent  Progression:  Waxing and waning  Chronicity:  New  Context: not alcohol use, not awakening from sleep, not diet changes, not eating, not laxative use, not medication withdrawal, not previous surgeries, not recent illness, not recent sexual activity, not recent travel, not retching, not sick contacts, not suspicious food intake and not trauma    Relieved by:  Nothing  Ineffective treatments:  None tried  Associated symptoms: constipation and nausea    Associated symptoms: no anorexia, no belching, no chest pain, no chills, no cough, no diarrhea, no dysuria, no fatigue, no fever, no flatus, no hematemesis, no hematochezia, no hematuria, no melena, no shortness of breath, no sore throat and no vomiting        Prior to Admission Medications   Prescriptions Last Dose Informant Patient Reported? Taking?    Omeprazole (PRILOSEC PO)   Yes No   Sig: Take 20 mg by mouth daily   albuterol (PROVENTIL HFA,VENTOLIN HFA) 90 mcg/act inhaler Yes No   Sig: Inhale 2 puffs every 6 (six) hours as needed for wheezing   benzonatate (TESSALON PERLES) 100 mg capsule   No No   Sig: Take 1 capsule (100 mg total) by mouth every 8 (eight) hours   hydroxychloroquine (PLAQUENIL) 200 mg tablet   Yes No   Sig: Take 200 mg by mouth 2 (two) times a day   levocetirizine (XYZAL) 5 MG tablet   Yes No   Sig: Take 5 mg by mouth daily at bedtime as needed   levothyroxine 125 mcg tablet   Yes No   Sig: Take 125 mcg by mouth daily   montelukast (SINGULAIR) 10 mg tablet   Yes No   Sig: Take 10 mg by mouth daily at bedtime   oxyCODONE-acetaminophen (PERCOCET) 5-325 mg per tablet   No No   Sig: Take 1 tablet by mouth every 4 (four) hours as needed for moderate pain Max Daily Amount: 6 tablets      Facility-Administered Medications: None       Past Medical History:   Diagnosis Date    Disease of thyroid gland     graves disease    Rheumatoid arthritis ([facility] [address] )        Past Surgical History:   Procedure Laterality Date    KNEE SURGERY      SHOULDER SURGERY         History reviewed  No pertinent family history  I have reviewed and agree with the history as documented  E-Cigarette/Vaping     E-Cigarette/Vaping Substances     Social History     Tobacco Use    Smoking status: Former Smoker    Smokeless tobacco: Never Used   Substance Use Topics    Alcohol use: Yes     Alcohol/week: 14 0 standard drinks     Types: 14 Cans of beer per week     Comment: Hasnt drank in 2 weeks because of feeling sick    Drug use: No       Review of Systems   Constitutional: Negative for activity change, appetite change, chills, fatigue and fever  HENT: Negative for congestion, dental problem, drooling, ear discharge, ear pain, mouth sores, nosebleeds, rhinorrhea, sore throat and trouble swallowing  Eyes: Negative for pain, discharge and itching  Respiratory: Negative for cough, chest tightness, shortness of breath and wheezing  Cardiovascular: Negative for chest pain and palpitations  Gastrointestinal: Positive for abdominal pain, constipation and nausea  Negative for anorexia, blood in stool, diarrhea, flatus, hematemesis, hematochezia, melena and vomiting  Endocrine: Negative for cold intolerance and heat intolerance  Genitourinary: Negative for difficulty urinating, dysuria, flank pain, frequency, hematuria and urgency  Skin: Negative for rash and wound  Allergic/Immunologic: Negative for food allergies and immunocompromised state  Neurological: Negative for dizziness, seizures, syncope, weakness, numbness and headaches  Psychiatric/Behavioral: Negative for agitation, behavioral problems and confusion  Physical Exam  Physical Exam  Vitals signs and nursing note reviewed  Constitutional:       General: He is not in acute distress  Appearance: He is not diaphoretic  HENT:      Head: Normocephalic and atraumatic  Right Ear: External ear normal       Left Ear: External ear normal       Mouth/Throat:      Pharynx: No oropharyngeal exudate  Eyes:      Conjunctiva/sclera: Conjunctivae normal    Neck:      Vascular: No JVD  Trachea: No tracheal deviation  Cardiovascular:      Rate and Rhythm: Normal rate and regular rhythm  Heart sounds: Normal heart sounds  No murmur  No friction rub  No gallop  Pulmonary:      Effort: No respiratory distress  Breath sounds: No wheezing or rales  Chest:      Chest wall: No tenderness  Abdominal:      General: Bowel sounds are normal  There is no distension  Palpations: Abdomen is soft  Tenderness: There is abdominal tenderness in the suprapubic area and left lower quadrant  There is no right CVA tenderness, left CVA tenderness or guarding  Musculoskeletal: Normal range of motion  General: No tenderness or deformity  Lymphadenopathy:      Cervical: No cervical adenopathy  Skin:     General: Skin is warm and dry  Findings: No erythema or rash     Neurological:      Mental Status: He is alert and oriented to person, place, and time     Psychiatric:         Mood and Affect: Mood normal          Behavior: Behavior normal          Vital Signs  ED Triage Vitals [09/06/20 1005]   Temperature Pulse Respirations Blood Pressure SpO2   98 3 °F (36 8 °C) 75 18 139/67 98 %      Temp Source Heart Rate Source Patient Position - Orthostatic VS BP Location FiO2 (%)   Oral Monitor Lying Right arm --      Pain Score       6           Vitals:    09/06/20 1005 09/06/20 1117 09/06/20 1134   BP: 139/67 112/66 118/66   Pulse: 75 66 70   Patient Position - Orthostatic VS: Lying           Visual Acuity      ED Medications  Medications   sodium chloride 0 9 % bolus 1,000 mL (1,000 mL Intravenous New Bag 9/6/20 1023)   ondansetron (ZOFRAN) injection 4 mg (4 mg Intravenous Given 9/6/20 1024)   iohexol (OMNIPAQUE) 350 MG/ML injection (MULTI-DOSE) 100 mL (100 mL Intravenous Given 9/6/20 1131)       Diagnostic Studies  Results Reviewed     Procedure Component Value Units Date/Time    Urine Macroscopic, POC [[id number]] Collected:  09/06/20 1115    Lab Status:  Final result Specimen:  Urine Updated:  09/06/20 1116     Color, UA Yellow     Clarity, UA Clear     pH, UA 7 5     Leukocytes, UA Negative     Nitrite, UA Negative     Protein, UA Negative mg/dl      Glucose, UA Negative mg/dl      Ketones, UA Negative mg/dl      Urobilinogen, UA 0 2 E U /dl      Bilirubin, UA Negative     Blood, UA Negative     Specific Gravity, UA 1 020    Narrative:       CLINITEK RESULT    Comprehensive metabolic panel [[id number]] Collected:  09/06/20 1019    Lab Status:  Final result Specimen:  Blood from Arm, Left Updated:  09/06/20 1045     Sodium 139 mmol/L      Potassium 4 4 mmol/L      Chloride 102 mmol/L      CO2 29 mmol/L      ANION GAP 8 mmol/L      BUN 16 mg/dL      Creatinine 1 04 mg/dL      Glucose 136 mg/dL      Calcium 8 8 mg/dL      AST 12 U/L      ALT 16 U/L      Alkaline Phosphatase 61 U/L      Total Protein 7 4 g/dL      Albumin 3 7 g/dL      Total Bilirubin 0 49 mg/dL      eGFR 80 ml/min/1 73sq m     Narrative:       National Kid[employer] guidelines for Chronic Kidney Disease (CKD):     Stage 1 with normal or high GFR (GFR > 90 mL/min/1 73 square meters)    Stage 2 Mild CKD (GFR = 60-89 mL/min/1 73 square meters)    Stage 3A Moderate CKD (GFR = 45-59 mL/min/1 73 square meters)    Stage 3B Moderate CKD (GFR = 30-44 mL/min/1 73 square meters)    Stage 4 Severe CKD (GFR = 15-29 mL/min/1 73 square meters)    Stage 5 End Stage CKD (GFR <15 mL/min/1 73 square meters)  Note: GFR calculation is accurate only with a steady state creatinine    Lipase [[id number]]  (Normal) Collected:  09/06/20 1019    Lab Status:  Final result Specimen:  Blood from Arm, Left Updated:  09/06/20 1045     Lipase 146 u/L     CBC and differential [[id number]]  (Abnormal) Collected:  09/06/20 1019    Lab Status:  Final result Specimen:  Blood from Arm, Left Updated:  09/06/20 1026     WBC 10 17 Thousand/uL      RBC 4 61 Million/uL      Hemoglobin 14 3 g/dL      Hematocrit 43 2 %      MCV 94 fL      MCH 31 0 pg      MCHC 33 1 g/dL      RDW 12 0 %      MPV 9 5 fL      Platelets 145 Thousands/uL      nRBC 0 /100 WBCs      Neutrophils Relative 80 %      Immat GRANS % 0 %      Lymphocytes Relative 9 %      Monocytes Relative 9 %      Eosinophils Relative 1 %      Basophils Relative 1 %      Neutrophils Absolute 8 15 Thousands/µL      Immature Grans Absolute 0 03 Thousand/uL      Lymphocytes Absolute 0 92 Thousands/µL      Monocytes Absolute 0 89 Thousand/µL      Eosinophils Absolute 0 11 Thousand/µL      Basophils Absolute 0 07 Thousands/µL                  CT abdomen pelvis with contrast   Final Result by Munira Granda MD (09/06 1206)      Acute uncomplicated diverticulitis      The study was marked in EPIC for immediate notification              Workstation performed: [id number]                    Procedures  Procedures         ED Course MDM  Number of Diagnoses or Management Options  Diverticulitis:   Diagnosis management comments: Differential diagnosis includes but not limited to:  Diverticulitis, kidney stone appendicitis, cystitis         Amount and/or Complexity of Data Reviewed  Clinical lab tests: ordered and reviewed  Tests in the radiology section of CPT®: ordered and reviewed          Disposition  Final diagnoses:   Diverticulitis     Time reflects when diagnosis was documented in both MDM as applicable and the Disposition within this note     Time User Action Codes Description Comment    9/6/2020 12:25 PM Naveen Leon Add [K57 [id number]] Diverticulitis       ED Disposition     ED Disposition Condition Date/Time Comment    Discharge Stable Sun Sep 6, 2020 12:25 PM Loli Gasca discharge to home/self care  Follow-up Information     Follow up With Specialties Details Why Contact Info    Yary Hough MD Family Medicine Schedule an appointment as soon as possible for a visit   [place] [id number]  [phone]            Patient's Medications   Discharge Prescriptions    CIPROFLOXACIN (CIPRO) 500 MG TABLET    Take 1 tablet (500 mg total) by mouth 2 (two) times a day for 10 days       Start Date: 9/6/2020  End Date: 9/16/2020       Order Dose: 500 mg       Quantity: 20 tablet    Refills: 0    METRONIDAZOLE (FLAGYL) 500 MG TABLET    Take 1 tablet (500 mg total) by mouth 3 (three) times a day for 10 days       Start Date: 9/6/2020  End Date: 9/16/2020       Order Dose: 500 mg       Quantity: 30 tablet    Refills: 0    ONDANSETRON (ZOFRAN ODT) 4 MG DISINTEGRATING TABLET    Take 1 tablet (4 mg total) by mouth every 8 (eight) hours as needed for nausea for up to 15 doses       Start Date: 9/6/2020  End Date: --       Order Dose: 4 mg       Quantity: 15 tablet    Refills: 0     No discharge procedures on file      PDMP Review     None          ED Provider  Electronically Signed by           Kieran Seth PA-C  09/06/20 2439

## 2020-09-06 NOTE — ED NOTES
Reports nausea being ok  Call bell within reach, bed low position       Cyrus Rojas RN  09/06/20 1037

## 2021-01-13 ENCOUNTER — DOCTOR'S OFFICE (OUTPATIENT)
Dept: URBAN - METROPOLITAN AREA CLINIC 137 | Facility: CLINIC | Age: 56
Setting detail: OPHTHALMOLOGY
End: 2021-01-13
Payer: COMMERCIAL

## 2021-01-13 DIAGNOSIS — Z79.899: ICD-10-CM

## 2021-01-13 DIAGNOSIS — H25.13: ICD-10-CM

## 2021-01-13 PROCEDURE — 92083 EXTENDED VISUAL FIELD XM: CPT | Performed by: OPHTHALMOLOGY

## 2021-01-13 PROCEDURE — 92134 CPTRZ OPH DX IMG PST SGM RTA: CPT | Performed by: OPHTHALMOLOGY

## 2021-01-13 PROCEDURE — 92004 COMPRE OPH EXAM NEW PT 1/>: CPT | Performed by: OPHTHALMOLOGY

## 2021-01-13 ASSESSMENT — CONFRONTATIONAL VISUAL FIELD TEST (CVF)
OD_FINDINGS: FULL
OS_FINDINGS: FULL

## 2021-01-13 ASSESSMENT — SPHEQUIV_DERIVED
OD_SPHEQUIV: 1.375
OS_SPHEQUIV: 1.375

## 2021-01-13 ASSESSMENT — REFRACTION_AUTOREFRACTION
OS_CYLINDER: -0.75
OS_SPHERE: +1.75
OS_AXIS: 097
OD_SPHERE: +1.75
OD_AXIS: 094
OD_CYLINDER: -0.75

## 2021-01-13 ASSESSMENT — REFRACTION_CURRENTRX
OS_ADD: +2.50
OD_ADD: +2.50
OD_VPRISM_DIRECTION: PROGS
OD_CYLINDER: -0.75
OS_SPHERE: +2.00
OD_AXIS: 103
OS_CYLINDER: -0.75
OS_AXIS: 089
OS_OVR_VA: 20/
OD_OVR_VA: 20/
OD_SPHERE: +2.00
OS_VPRISM_DIRECTION: PROGS

## 2021-01-13 ASSESSMENT — AXIALLENGTH_DERIVED
OS_AL: 22.8077
OD_AL: 22.8077

## 2021-01-13 ASSESSMENT — TONOMETRY
OS_IOP_MMHG: 14
OD_IOP_MMHG: 11

## 2021-01-13 ASSESSMENT — KERATOMETRY
OD_K1POWER_DIOPTERS: 44.50
OD_AXISANGLE_DEGREES: 020
OD_K2POWER_DIOPTERS: 44.00
OS_AXISANGLE_DEGREES: 163
OS_K1POWER_DIOPTERS: 44.50
OS_K2POWER_DIOPTERS: 44.00

## 2021-01-13 ASSESSMENT — VISUAL ACUITY
OS_BCVA: 20/20
OD_BCVA: 20/20

## 2022-02-22 ENCOUNTER — APPOINTMENT (EMERGENCY)
Dept: RADIOLOGY | Facility: HOSPITAL | Age: 57
End: 2022-02-22
Payer: COMMERCIAL

## 2022-02-22 ENCOUNTER — HOSPITAL ENCOUNTER (EMERGENCY)
Facility: HOSPITAL | Age: 57
Discharge: HOME | End: 2022-02-22
Attending: EMERGENCY MEDICINE | Admitting: EMERGENCY MEDICINE
Payer: COMMERCIAL

## 2022-02-22 VITALS
TEMPERATURE: 97.6 F | HEART RATE: 48 BPM | RESPIRATION RATE: 18 BRPM | SYSTOLIC BLOOD PRESSURE: 154 MMHG | OXYGEN SATURATION: 100 % | DIASTOLIC BLOOD PRESSURE: 73 MMHG

## 2022-02-22 DIAGNOSIS — Z87.898 HISTORY OF WHEEZING: ICD-10-CM

## 2022-02-22 DIAGNOSIS — M54.6 LEFT-SIDED THORACIC BACK PAIN: Primary | ICD-10-CM

## 2022-02-22 DIAGNOSIS — J45.909 ASTHMA: ICD-10-CM

## 2022-02-22 DIAGNOSIS — Z87.01 HISTORY OF PNEUMONIA: ICD-10-CM

## 2022-02-22 LAB
ALBUMIN SERPL BCP-MCNC: 4.1 G/DL (ref 3.5–5)
ALP SERPL-CCNC: 50 U/L (ref 46–116)
ALT SERPL W P-5'-P-CCNC: 19 U/L (ref 12–78)
ANION GAP SERPL CALCULATED.3IONS-SCNC: 6 MMOL/L (ref 4–13)
AST SERPL W P-5'-P-CCNC: 11 U/L (ref 5–45)
BASOPHILS # BLD AUTO: 0.07 THOUSANDS/ΜL (ref 0–0.1)
BASOPHILS NFR BLD AUTO: 1 % (ref 0–1)
BILIRUB SERPL-MCNC: 0.26 MG/DL (ref 0.2–1)
BUN SERPL-MCNC: 13 MG/DL (ref 5–25)
CALCIUM SERPL-MCNC: 9.2 MG/DL (ref 8.3–10.1)
CARDIAC TROPONIN I PNL SERPL HS: <2 NG/L
CARDIAC TROPONIN I PNL SERPL HS: <2 NG/L
CHLORIDE SERPL-SCNC: 104 MMOL/L (ref 100–108)
CO2 SERPL-SCNC: 28 MMOL/L (ref 21–32)
CREAT SERPL-MCNC: 0.93 MG/DL (ref 0.6–1.3)
D DIMER PPP FEU-MCNC: 0.32 UG/ML FEU
EOSINOPHIL # BLD AUTO: 0.1 THOUSAND/ΜL (ref 0–0.61)
EOSINOPHIL NFR BLD AUTO: 1 % (ref 0–6)
ERYTHROCYTE [DISTWIDTH] IN BLOOD BY AUTOMATED COUNT: 12.3 % (ref 11.6–15.1)
GFR SERPL CREATININE-BSD FRML MDRD: 90 ML/MIN/1.73SQ M
GLUCOSE SERPL-MCNC: 99 MG/DL (ref 65–140)
HCT VFR BLD AUTO: 42.7 % (ref 36.5–49.3)
HGB BLD-MCNC: 14.4 G/DL (ref 12–17)
IMM GRANULOCYTES # BLD AUTO: 0.02 THOUSAND/UL (ref 0–0.2)
IMM GRANULOCYTES NFR BLD AUTO: 0 % (ref 0–2)
LYMPHOCYTES # BLD AUTO: 1.9 THOUSANDS/ΜL (ref 0.6–4.47)
LYMPHOCYTES NFR BLD AUTO: 20 % (ref 14–44)
MCH RBC QN AUTO: 30.6 PG (ref 26.8–34.3)
MCHC RBC AUTO-ENTMCNC: 33.7 G/DL (ref 31.4–37.4)
MCV RBC AUTO: 91 FL (ref 82–98)
MONOCYTES # BLD AUTO: 0.96 THOUSAND/ΜL (ref 0.17–1.22)
MONOCYTES NFR BLD AUTO: 10 % (ref 4–12)
NEUTROPHILS # BLD AUTO: 6.32 THOUSANDS/ΜL (ref 1.85–7.62)
NEUTS SEG NFR BLD AUTO: 68 % (ref 43–75)
NRBC BLD AUTO-RTO: 0 /100 WBCS
PLATELET # BLD AUTO: 309 THOUSANDS/UL (ref 149–390)
PMV BLD AUTO: 9.3 FL (ref 8.9–12.7)
POTASSIUM SERPL-SCNC: 4.2 MMOL/L (ref 3.5–5.3)
PROT SERPL-MCNC: 7.1 G/DL (ref 6.4–8.2)
RBC # BLD AUTO: 4.71 MILLION/UL (ref 3.88–5.62)
SODIUM SERPL-SCNC: 138 MMOL/L (ref 136–145)
WBC # BLD AUTO: 9.37 THOUSAND/UL (ref 4.31–10.16)

## 2022-02-22 PROCEDURE — 99284 EMERGENCY DEPT VISIT MOD MDM: CPT

## 2022-02-22 PROCEDURE — 80053 COMPREHEN METABOLIC PANEL: CPT | Performed by: EMERGENCY MEDICINE

## 2022-02-22 PROCEDURE — 71045 X-RAY EXAM CHEST 1 VIEW: CPT

## 2022-02-22 PROCEDURE — 36415 COLL VENOUS BLD VENIPUNCTURE: CPT

## 2022-02-22 PROCEDURE — 99284 EMERGENCY DEPT VISIT MOD MDM: CPT | Performed by: EMERGENCY MEDICINE

## 2022-02-22 PROCEDURE — 84484 ASSAY OF TROPONIN QUANT: CPT | Performed by: EMERGENCY MEDICINE

## 2022-02-22 PROCEDURE — 93005 ELECTROCARDIOGRAM TRACING: CPT

## 2022-02-22 PROCEDURE — 96374 THER/PROPH/DIAG INJ IV PUSH: CPT

## 2022-02-22 PROCEDURE — 85025 COMPLETE CBC W/AUTO DIFF WBC: CPT | Performed by: EMERGENCY MEDICINE

## 2022-02-22 PROCEDURE — 85379 FIBRIN DEGRADATION QUANT: CPT | Performed by: EMERGENCY MEDICINE

## 2022-02-22 RX ORDER — ACETAMINOPHEN 325 MG/1
650 TABLET ORAL ONCE
Status: COMPLETED | OUTPATIENT
Start: 2022-02-22 | End: 2022-02-22

## 2022-02-22 RX ORDER — AZITHROMYCIN 250 MG/1
TABLET, FILM COATED ORAL
Qty: 6 TABLET | Refills: 0 | Status: SHIPPED | OUTPATIENT
Start: 2022-02-22 | End: 2022-02-26

## 2022-02-22 RX ORDER — DEXAMETHASONE SODIUM PHOSPHATE 4 MG/ML
10 INJECTION, SOLUTION INTRA-ARTICULAR; INTRALESIONAL; INTRAMUSCULAR; INTRAVENOUS; SOFT TISSUE ONCE
Status: COMPLETED | OUTPATIENT
Start: 2022-02-22 | End: 2022-02-22

## 2022-02-22 RX ADMIN — DEXAMETHASONE SODIUM PHOSPHATE 10 MG: 4 INJECTION INTRA-ARTICULAR; INTRALESIONAL; INTRAMUSCULAR; INTRAVENOUS; SOFT TISSUE at 13:33

## 2022-02-22 RX ADMIN — ACETAMINOPHEN 650 MG: 325 TABLET, FILM COATED ORAL at 13:33

## 2022-02-22 NOTE — ED PROVIDER NOTES
History  Chief Complaint   Patient presents with    Back Pain     Pt reports thinking he has bronchitis  Pt reports having pain in his back that started thursday of last week  Pt reports his pain is on the left side lower back that radiates up to his neck  Pt reports wheezing a little bit     Patient is a 58-year-old male who presents to the emergency department for evaluation with left upper back discomfort  It has been ongoing and progressive over the last 1 week  He describes it feeling like I'm getting pneumonia    He describes having noticed wheezing on multiple occasions over this past week  He shares that he does have asthma but at times will go as long as 6 month intervals without requiring use of his albuterol inhaler  He relates that he needed to use this 8 times during the day 3 days ago-noticing improvement in wheezing briefly after each use  Occasionally he notes a sharp discomfort in the left upper back with activity and deep breath  He is unable to sleep in his usual comfortable position on his abdomen and has needed to lie on his back at night to help with breathing and discomfort He has not appreciated any fevers, coughing, nasal congestion, sore throat, nausea or vomiting  He did have some brief abdominal discomfort last week which he attributed to diet eaten in the setting of diverticulitis  All GI symptoms have resolved  No urinary symptoms leg swelling, cramping or immobilization  He did transiently appreciates some paresthesias and sensation of weakness in the left lower leg but thinks that this may be related to his prior knee replacement  This has been intermittent for months  He expresses concern that he will be traveling to [place] next week where he will not have access to medical care  Past medical history significant for the asthma, rheumatoid arthritis, diverticulitis and Graves disease    He is on levothyroxine replacement following thyroid radiation and follows with physician regularly for level checks  He takes Symbicort regularly for respiratory issues  Relates that diagnosis of COPD has been suggested  He does regularly perform physical labor in construction and reports exposure to much debris including dust from drywall  No known history of coagulopathy  Prior to Admission Medications   Prescriptions Last Dose Informant Patient Reported? Taking? Omeprazole (PRILOSEC PO)   Yes No   Sig: Take 20 mg by mouth daily   albuterol (PROVENTIL HFA,VENTOLIN HFA) 90 mcg/act inhaler   Yes No   Sig: Inhale 2 puffs every 6 (six) hours as needed for wheezing   benzonatate (TESSALON PERLES) 100 mg capsule   No No   Sig: Take 1 capsule (100 mg total) by mouth every 8 (eight) hours   hydroxychloroquine (PLAQUENIL) 200 mg tablet   Yes No   Sig: Take 200 mg by mouth 2 (two) times a day   levocetirizine (XYZAL) 5 MG tablet   Yes No   Sig: Take 5 mg by mouth daily at bedtime as needed   levothyroxine 125 mcg tablet   Yes No   Sig: Take 125 mcg by mouth daily   montelukast (SINGULAIR) 10 mg tablet   Yes No   Sig: Take 10 mg by mouth daily at bedtime   ondansetron (Zofran ODT) 4 mg disintegrating tablet   No No   Sig: Take 1 tablet (4 mg total) by mouth every 8 (eight) hours as needed for nausea for up to 15 doses   oxyCODONE-acetaminophen (PERCOCET) 5-325 mg per tablet   No No   Sig: Take 1 tablet by mouth every 4 (four) hours as needed for moderate pain Max Daily Amount: 6 tablets      Facility-Administered Medications: None       Past Medical History:   Diagnosis Date    COPD (chronic obstructive pulmonary disease) (HCC)     Disease of thyroid gland     graves disease    Rheumatoid arthritis (HCC)        Past Surgical History:   Procedure Laterality Date    KNEE SURGERY      SHOULDER SURGERY         History reviewed  No pertinent family history  I have reviewed and agree with the history as documented      E-Cigarette/Vaping    E-Cigarette Use Never User E-Cigarette/Vaping Substances     Social History     Tobacco Use    Smoking status: Former Smoker    Smokeless tobacco: Never Used   Vaping Use    Vaping Use: Never used   Substance Use Topics    Alcohol use: Yes     Alcohol/week: 14 0 standard drinks     Types: 14 Cans of beer per week     Comment: ocassionally    Drug use: No       Review of Systems   All other systems reviewed and are negative  Physical Exam  Physical Exam  Vitals and nursing note reviewed  Constitutional:       Appearance: Normal appearance  HENT:      Head: Normocephalic  Mouth/Throat:      Mouth: Mucous membranes are moist    Eyes:      Extraocular Movements: Extraocular movements intact  Conjunctiva/sclera: Conjunctivae normal    Cardiovascular:      Rate and Rhythm: Normal rate and regular rhythm  Pulmonary:      Effort: Pulmonary effort is normal       Breath sounds: Normal breath sounds  No wheezing  Chest:      Chest wall: Tenderness (Present to light touch over the left supraclavicular region and entire posterior thorax  No skin lesions ) present  Musculoskeletal:         General: No tenderness  Normal range of motion  Cervical back: Normal range of motion  Right lower leg: No edema  Left lower leg: No edema  Skin:     General: Skin is warm and dry  Neurological:      General: No focal deficit present  Mental Status: He is alert and oriented to person, place, and time     Psychiatric:         Mood and Affect: Mood normal          Behavior: Behavior normal          Vital Signs  ED Triage Vitals [02/22/22 1031]   Temperature Pulse Respirations Blood Pressure SpO2   97 6 °F (36 4 °C) 62 20 165/69 98 %      Temp Source Heart Rate Source Patient Position - Orthostatic VS BP Location FiO2 (%)   Oral Monitor Sitting Left arm --      Pain Score       7           Vitals:    02/22/22 1031 02/22/22 1203 02/22/22 1400   BP: 165/69 135/79 154/73   Pulse: 62 (!) 54 (!) 48   Patient Position - Orthostatic VS: Sitting Sitting Sitting         Visual Acuity      ED Medications  Medications   dexamethasone (DECADRON) injection 10 mg (10 mg Intravenous Given 2/22/22 1333)   acetaminophen (TYLENOL) tablet 650 mg (650 mg Oral Given 2/22/22 1333)       Diagnostic Studies  Results Reviewed     Procedure Component Value Units Date/Time    D-Dimer [[id number]]  (Normal) Collected: 02/22/22 1105    Lab Status: Final result Specimen: Blood from Arm, Right Updated: 02/22/22 1417     D-Dimer, Quant 0 32 ug/ml FEU     Narrative: In the evaluation for possible pulmonary embolism, in the appropriate (Well's Score of 4 or less) patient, the age adjusted d-dimer cutoff for this patient can be calculated as:    Age x 0 01 (in ug/mL) for Age-adjusted D-dimer exclusion threshold for a patient over 50 years      HS Troponin I 2hr [[id number]] Collected: 02/22/22 1303    Lab Status: Final result Specimen: Blood from Arm, Right Updated: 02/22/22 1343     hs TnI 2hr <2 ng/L      Delta 2hr hsTnI --    HS Troponin 0hr (reflex protocol) [[id number]]  (Normal) Collected: 02/22/22 1105    Lab Status: Final result Specimen: Blood from Arm, Right Updated: 02/22/22 1221     hs TnI 0hr <2 ng/L     Comprehensive metabolic panel [[id number]] Collected: 02/22/22 1105    Lab Status: Final result Specimen: Blood from Arm, Right Updated: 02/22/22 1213     Sodium 138 mmol/L      Potassium 4 2 mmol/L      Chloride 104 mmol/L      CO2 28 mmol/L      ANION GAP 6 mmol/L      BUN 13 mg/dL      Creatinine 0 93 mg/dL      Glucose 99 mg/dL      Calcium 9 2 mg/dL      AST 11 U/L      ALT 19 U/L      Alkaline Phosphatase 50 U/L      Total Protein 7 1 g/dL      Albumin 4 1 g/dL      Total Bilirubin 0 26 mg/dL      eGFR 90 ml/min/1 73sq m     Narrative:      [place] guidelines for Chronic Kidney Disease (CKD):     Stage 1 with normal or high GFR (GFR > 90 mL/min/1 73 square meters)    Stage 2 Mild CKD (GFR = 60-89 mL/min/1 73 square meters)    Stage 3A Moderate CKD (GFR = 45-59 mL/min/1 73 square meters)    Stage 3B Moderate CKD (GFR = 30-44 mL/min/1 73 square meters)    Stage 4 Severe CKD (GFR = 15-29 mL/min/1 73 square meters)    Stage 5 End Stage CKD (GFR <15 mL/min/1 73 square meters)  Note: GFR calculation is accurate only with a steady state creatinine    CBC and differential [[id number]] Collected: 02/22/22 1105    Lab Status: Final result Specimen: Blood from Arm, Right Updated: 02/22/22 1131     WBC 9 37 Thousand/uL      RBC 4 71 Million/uL      Hemoglobin 14 4 g/dL      Hematocrit 42 7 %      MCV 91 fL      MCH 30 6 pg      MCHC 33 7 g/dL      RDW 12 3 %      MPV 9 3 fL      Platelets 914 Thousands/uL      nRBC 0 /100 WBCs      Neutrophils Relative 68 %      Immat GRANS % 0 %      Lymphocytes Relative 20 %      Monocytes Relative 10 %      Eosinophils Relative 1 %      Basophils Relative 1 %      Neutrophils Absolute 6 32 Thousands/µL      Immature Grans Absolute 0 02 Thousand/uL      Lymphocytes Absolute 1 90 Thousands/µL      Monocytes Absolute 0 96 Thousand/µL      Eosinophils Absolute 0 10 Thousand/µL      Basophils Absolute 0 07 Thousands/µL                  XR chest 1 view portable   Final Result by Adrianna Cooper MD (02/22 2741)      No acute cardiopulmonary disease                    Workstation performed: [id number]                    Procedures  ECG 12 Lead Documentation Only    Date/Time: 2/22/2022 6:52 PM  Performed by: Nura Mock MD  Authorized by: Nura Mock MD     Previous ECG:     Previous ECG:  Unavailable (Written description of EKG from March 5, 2019 at [facility] AT THE [facility] revealed sinus bradycardia without ST/T-wave abnormality)  Rate:     ECG rate:  57  Rhythm:     Rhythm: sinus bradycardia    QRS:     QRS axis:  Normal    QRS intervals:  Normal  Conduction:     Conduction: normal    ST segments:     ST segments:  Normal  T waves:     T waves: flattening      Flattening:  AVL ED Course  ED Course as of 02/22/22 1853   Tue Feb 22, 2022   1347 Initial studies ordered at triage include EKG and troponin-unremarkable in the setting of several continuous hours worth of symptoms  ACS excluded  CBC, chemistry and chest x-ray are unremarkable-not suggestive pneumonia as patient had concern for  No 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 or my examination although patient endorses having noticed this on multiple occasions and having required increased inhaler use  Suspect that he may have flare of asthma verses less likely early bronchitis/pneumonia  Given occasional sharp nature of discomfort will obtain D-dimer in consideration for possible PE  Have administered dexamethasone to assist with information and will consider prescribing antibiotic to if symptoms worsen especially around travel event without medical care  SBIRT 22yo+      Most Recent Value   SBIRT (24 yo +)    In order to provide better care to our patients, we are screening all of our patients for alcohol and drug use  Would it be okay to ask you these screening questions?  Unable to answer at this time Filed at: 02/22/2022 1328                    MDM    Disposition  Final diagnoses:   Left-sided thoracic back pain   History of wheezing   Asthma   History of pneumonia     Time reflects when diagnosis was documented in both MDM as applicable and the Disposition within this note     Time User Action Codes Description Comment    2/22/2022  2:29 PM Christopher ROJAS Add [M54 6] Left-sided thoracic back pain     2/22/2022  2:30 PM Christopher Shen Add [Q30 031] History of wheezing     2/22/2022  2:30 PM Christopher Shen Add [H42 241] Asthma     2/22/2022  2:34 PM Christopher ROJAS Add [Z87 01] History of pneumonia       ED Disposition     ED Disposition Condition Date/Time Comment    Discharge Stable Tue Feb 22, 2022  2:29 PM Corina eNlson discharge to home/self care  Follow-up Information     Follow up With Specialties Details Why Contact Info    Jonas Chen MD [facility]   [address]  [place] [id number] [phone]            Discharge Medication List as of 2/22/2022  2:34 PM      START taking these medications    Details   azithromycin (ZITHROMAX) 250 mg tablet Take 2 tablets today then 1 tablet daily x 4 days, Normal         CONTINUE these medications which have NOT CHANGED    Details   albuterol (PROVENTIL HFA,VENTOLIN HFA) 90 mcg/act inhaler Inhale 2 puffs every 6 (six) hours as needed for wheezing, Historical Med      benzonatate (TESSALON PERLES) 100 mg capsule Take 1 capsule (100 mg total) by mouth every 8 (eight) hours, Starting Sun 1/20/2019, Print      hydroxychloroquine (PLAQUENIL) 200 mg tablet Take 200 mg by mouth 2 (two) times a day, Historical Med      levocetirizine (XYZAL) 5 MG tablet Take 5 mg by mouth daily at bedtime as needed, Starting Thu 6/21/2018, Until Fri 6/21/2019, Historical Med      levothyroxine 125 mcg tablet Take 125 mcg by mouth daily, Historical Med      montelukast (SINGULAIR) 10 mg tablet Take 10 mg by mouth daily at bedtime, Historical Med      Omeprazole (PRILOSEC PO) Take 20 mg by mouth daily, Historical Med      ondansetron (Zofran ODT) 4 mg disintegrating tablet Take 1 tablet (4 mg total) by mouth every 8 (eight) hours as needed for nausea for up to 15 doses, Starting Sun 9/6/2020, Normal      oxyCODONE-acetaminophen (PERCOCET) 5-325 mg per tablet Take 1 tablet by mouth every 4 (four) hours as needed for moderate pain Max Daily Amount: 6 tablets, Starting Sat 1/26/2019, Print             No discharge procedures on file      PDMP Review     None          ED Provider  Electronically Signed by           Shannen Barillas MD  02/22/22 9402

## 2022-02-22 NOTE — DISCHARGE INSTRUCTIONS
Your EKG, heart enzymes and x-ray were unremarkable today  A dose of dexamethasone was given (steroid) to assist with potential mild inflammation of the bronchials with your report wheezing at home and increased use of albuterol inhaler  You may take acetaminophen and/or ibuprofen to help with discomfort  Application of heat to area may provide with some relief as there is definitely a muscular component to your discomfort  Please follow-up with your primary care physician for any significant worsening/new concerns  Prescription for azithromycin has been prepared given your anticipated travel outside of the country in concern that current symptoms feel like prior episodes of pneumonia  You may take this medication if you appreciate development of fever, cough or increased shortness of breath while away  Follow-up medical evaluation advised along w/ this  Wheezing   WHAT YOU NEED TO KNOW:   Wheezing happens when air flows through a narrowed or blocked airway  Wheezing can happen when you breathe in, breathe out, or both  Wheezes may sound like a whistle, squeal, groan, or creak  Wheezes may also sound musical or high-pitched  DISCHARGE INSTRUCTIONS:   Call your local emergency number (911 in the [address] [place] [address],[address]) if:   You feel lightheaded, short of breath, and have chest pain  You are dizzy, confused, or feel faint  You have sudden trouble breathing  Your throat feels like it is swelling or feels tight  Return to the emergency department if:   You cough up blood  Call your doctor if:   You have a fever  Your wheezing does not get better or it gets worse  You have questions or concerns about your condition or care  Medicines:   Medicines  may help open your airways, decrease your symptoms, or treat an infection  They may be given as an inhaler, nebulizer, or pill  Take your medicine as directed    Contact your healthcare provider if you think your medicine is not helping or if you have side effects  Tell him or her if you are allergic to any medicine  Keep a list of the medicines, vitamins, and herbs you take  Include the amounts, and when and why you take them  Bring the list or the pill bottles to follow-up visits  Carry your medicine list with you in case of an emergency  Self care:   Return to your usual activity as directed  You may need to limit certain activities  Ask your healthcare provider when it is okay to resume activity  Take deep breaths and cough several times a day  This will decrease your risk for a lung infection and help decrease wheezing  Take a deep breath and hold it for as long as you can  Let the air out and then cough strongly  Deep breaths help open your airway  You may be given an incentive spirometer to help you take deep breaths  Put the plastic piece in your mouth and take a slow, deep breath in, then let the air out and cough  Repeat these steps 10 times every hour  Drink liquids as directed  You may need to drink more liquids than usual to thin your mucus and prevent dehydration  Ask how much liquid to drink each day and which liquids are best for you  Prevent wheezing:   Do not smoke  Nicotine and other chemicals in cigarettes and cigars can cause lung damage  Ask your healthcare provider for information if you currently smoke and need help to quit  E-cigarettes or smokeless tobacco still contain nicotine  Talk to your healthcare provider before you use these products  Avoid allergy triggers , such as animals, grass, pollen, or dust     Follow up with your doctor as directed: You may be referred to a specialist  Write down your questions so you remember to ask them during your visits  © Copyright [employer] 2021 Information is for End User's use only and may not be sold, redistributed or otherwise used for commercial purposes   All illustrations and images included in CareNotes® are the copyrighted property of [employer]  or [employer]  The above information is an  only  It is not intended as medical advice for individual conditions or treatments  Talk to your doctor, nurse or pharmacist before following any medical regimen to see if it is safe and effective for you

## 2022-02-24 LAB
ATRIAL RATE: 57 BPM
P AXIS: 75 DEGREES
PR INTERVAL: 178 MS
QRS AXIS: 51 DEGREES
QRSD INTERVAL: 96 MS
QT INTERVAL: 402 MS
QTC INTERVAL: 391 MS
T WAVE AXIS: 63 DEGREES
VENTRICULAR RATE: 57 BPM

## 2022-02-24 PROCEDURE — 93010 ELECTROCARDIOGRAM REPORT: CPT | Performed by: INTERNAL MEDICINE

## 2022-11-22 ENCOUNTER — TELEPHONE (OUTPATIENT)
Dept: GASTROENTEROLOGY | Facility: CLINIC | Age: 57
End: 2022-11-22

## 2023-10-06 ENCOUNTER — TELEPHONE (OUTPATIENT)
Dept: NEUROLOGY | Facility: CLINIC | Age: 58
End: 2023-10-06

## 2023-10-09 ENCOUNTER — TELEPHONE (OUTPATIENT)
Facility: MEDICAL CENTER | Age: 58
End: 2023-10-09

## 2023-10-09 ENCOUNTER — CONSULT (OUTPATIENT)
Dept: NEUROLOGY | Facility: CLINIC | Age: 58
End: 2023-10-09
Payer: COMMERCIAL

## 2023-10-09 VITALS
WEIGHT: 172.7 LBS | BODY MASS INDEX: 23.39 KG/M2 | DIASTOLIC BLOOD PRESSURE: 72 MMHG | HEART RATE: 59 BPM | HEIGHT: 72 IN | TEMPERATURE: 98.3 F | SYSTOLIC BLOOD PRESSURE: 122 MMHG

## 2023-10-09 DIAGNOSIS — A69.20 LYME BORRELIOSIS: Primary | ICD-10-CM

## 2023-10-09 DIAGNOSIS — F41.9 ANXIETY DISORDER, UNSPECIFIED TYPE: ICD-10-CM

## 2023-10-09 PROCEDURE — 99205 OFFICE O/P NEW HI 60 MIN: CPT | Performed by: PSYCHIATRY & NEUROLOGY

## 2023-10-09 RX ORDER — PREDNISONE 10 MG/1
10 TABLET ORAL AS NEEDED
COMMUNITY
Start: 2023-10-04

## 2023-10-09 RX ORDER — BUDESONIDE AND FORMOTEROL FUMARATE DIHYDRATE 160; 4.5 UG/1; UG/1
2 AEROSOL RESPIRATORY (INHALATION) DAILY
COMMUNITY

## 2023-10-09 RX ORDER — DOXYCYCLINE HYCLATE 100 MG
100 TABLET ORAL 2 TIMES DAILY
COMMUNITY
Start: 2023-10-04

## 2023-10-09 RX ORDER — FLUTICASONE PROPIONATE 50 MCG
1 SPRAY, SUSPENSION (ML) NASAL DAILY
COMMUNITY

## 2023-10-09 RX ORDER — MELOXICAM 15 MG/1
15 TABLET ORAL AS NEEDED
COMMUNITY
Start: 2023-09-13

## 2023-10-09 NOTE — PROGRESS NOTES
NEUROLOGY OUTPATIENT - NEW PATIENT VISIT NOTE        NAME: Nancy Myers  MRN: [id number]  : 1965     TODAY'S DATE: 10/09/23         HISTORY OF PRESENT ILLNESS (HPI):    Mr. Argentina Crandall is a 60 y.o. male presenting with Neurologic Problem (Concern for CNS Lyme )     Description: Brain fog--having problems with focussing and would lose train of thoughts. He mentions that there are also train of thoughts. He never had these issues before but they seems to be getting worse. He is not sure but mentions that it might have started a couple of years back. He plays a lot of golf and works outside as well. He does remember that he has taken a few tick bites but never mentions that he never had any tick bite. About 1.5 years back he told his wife that he is getting his memory affected. He mentions that these symptoms have affected short term memory complaints. No problems with his finances. He had a home equity loan due shortly. No hallucinations but some times he is having out of body experiences as well. He is not sure whether these happen close to his bed time. Never lost consciousness. No problems with driving but he will miss some of the exits but he thinks that his mind is going in different direction. Multi tasking is an issue. He was very good multi sabi but not any more. He feels that there is some problem with disinhibition and he would have frequent anger out bursts as well. He was also not able to keep the last couple of jobs possible secondary to his memory complaints. He works as a . He was also diagnosed with COVID infection (last March) after his symptoms had started. Never got vaccinated as he had been using hydro chlorquine for his underlying RA. No concussions, but he had a few head smacking at work. He feels that his life is affected and it is even affecting the balance, which he began to notice around spring time.  No jayde falls but he had a few near falls experience. He used to play a lot of golf but has been only played about a couple of times in the last few months due to his balance complaints. Stress at home and financially. Around Jan and Feb of this year something might have snapped and  might have caused these symptoms to trigger and may have bought out his Lyme disease. Wife had been addicted to pain killers and has been to rehab. She had been to rehab for alcohol and drugs. He also has a deadline coming up for a home equity loan. He is also having some tingling feeling all over body. He reports that the numbness is affecting all of his extremities. Previously he was blaming that on the uncomfortable seat of his car but feels that the symptoms are not related with that and can happen without any trigger. He mentions that the symptoms might have started a couple of months back and denies having any weakness but does report a lot of stiffness. He walks a couple of miles every day with his dog. He feel that his legs are tight he never gets any relaxation. He also feels very stiff. He does think it is fatigue but there is no relaxation. Vision problems (loss of vision or double vision): No new symptoms reported  Ambulatory dysfunction: No new symptoms reported  Vertigo and Dizziness: a couple of days back, he had an episode of vertigo  Depression: At baseline, these symptoms are present --no SI or HI. He is going to a therapist and they are recommending a medicine for his depression. [facility]   Anxiety: At baseline, these symptoms are present   Fatigue: no symptoms. Bladder symptoms: sometime he is having hesitancy   Bowel symptoms: at baseline he has diverticulitis        Fever: he gets warm in the afternoon, but never feels feverish. He has not checked his temperature. Weight loss: Yes, 225 pounds (5 years back) and now he weighs 172. Underwent colonoscopy 2 year back.    Skin rashes: No  Dry eyes: No  Dry mouth: No      Mother has Parkinson disease and early dementia. (80 year old), some days are worse than others. OUTSIDE RECORDS:    Dr. Ewa Kaplan, Rheumatology [facility]. Zoey Carter is a 60 y.o. male who presents for follow up visit for RA, chronic Lyme disease. Patient states he went to see his new PCP about a month ago. He reported that he was not feeling well. He had brain fog, fatigue, generalized achiness. His new PCP ordered additional testing which revealed positive Lyme Western blot IgG. His inflammatory marker was elevated as well, CRP was 45. He was treated with doxycycline as well as prednisone. Most recent CRP normalized. He continues to overall have achiness and is unable to work on due to his pains and fatigue. He has a physical job. Previously, he had right hand trigger finger surgery with Dr. Gisela Hickey. ever since using a heavy drill. He states he had CT wrist which was unremarkable. He has minimal AM stiffness. He does report numbness/tingling in his hands in the middle of night. He did have pneumonia in January 2019. He also had left TKA previously and doing wel. .  He believes paternal grandmother had some type of arthritis. He does report history of recurrent sinusitis. He had blood work in January 2018 -negative RENATE, ESR,Lyme screen. had history of + CCP, most recent one was ngeative. CURRENT OUTPATIENT MEDICATIONS:    Satish Meredith has a current medication list which includes the following prescription(s): albuterol, benzonatate, hydroxychloroquine, levocetirizine, levothyroxine, montelukast, omeprazole magnesium, ondansetron, and oxycodone-acetaminophen. PHYSICAL EXAMINATION:   VITAL SIGNS:  height is 6' (1.829 m) and weight is 78.3 kg (172 lb 11.2 oz). His temporal temperature is 98.3 °F (36.8 °C). His blood pressure is 122/72 and his pulse is 59.         NEUROLOGICAL EXAMINATION:    MENTAL STATUS EXAM: Alert, oriented to time place and person     CRANIAL NERVE EXAMINATION:  I Not tested   II Normal visual fields to finger counting. II, Ill,  IV, VI Pupils were symmetric, briskly reactive. No afferent pupillary defect. Eye movements are full without nystagmus. No ptosis. V Facial sensation were intact   VII Facial movements were symmetrical    VIII Hearing is normal to finger rub. IX, X Intact   XI Shoulder shrug and head turn is normal   XII Tongue protrusion is in the mid line. MOTOR EXAM:        Arm Right  Left Leg Right  Left   Deltoid 5/5 5/5 lliopsoas 5/5 5/5   Biceps 5/5 5/5 Quads 5/5 5/5   Triceps 5/5 5/5 Hamstrings 5/5 5/5   Wrist Extension 5/5 5/5 Ankle Dorsi Flexion 5/5 5/5   Wrist Flexion 5/5 5/5 Ankle Plantar Flexion 5/5 5/5            DEEP TENDON REFLEXES:     Brachioradialis Biceps Triceps Patellar Achilles   Right +2 +2 +2 +1 +1   Left +2 +2 +2 +1 +1            SENSORY EXAMINATION:   Sensation to dull touch and temperature is decreased on the right side     COORDINATION:   normal finger to nose testing with some dysmetria on the left side.       GAIT/STATION:   normal        DIAGNOSTIC STUDIES:   PERTINENT LABS:     Lab Results   Component Value Date    WBC 9.37 02/22/2022     Lab Results   Component Value Date    HGB 14.4 02/22/2022     Lab Results   Component Value Date     02/22/2022     Lab Results   Component Value Date    LYMPHSABS 1.90 02/22/2022     No results found for: "LABLYMP"  Lab Results   Component Value Date    EOSABS 0.10 02/22/2022       Lab Results   Component Value Date    CREATININE 0.93 02/22/2022       Lab Results   Component Value Date    AST 11 02/22/2022     Lab Results   Component Value Date    ALT 19 02/22/2022     Lab Results   Component Value Date    ALKPHOS 50 02/22/2022     Lab Results   Component Value Date    AST 11 02/22/2022               NEUROIMAGING:  No prior neuroimaging done in the system           ASSESSMENT AND PLAN:    Mr. Evie Mcdaniel is a 60 y.o.male  presenting with memory complaints involving the short-term memory, focusing and attention and comprehension, balance complaints with near falls experiences and sensory complaints all over his body. Patient has a past medical history of rheumatoid arthritis which was diagnosed about 4 years back and seems to be stable on Plaquenil and is being followed by Dr. Trista Alaniz at [facility] and work. He does endorse to some stress and anxiety secondary to his family and financial situation but it seems that he has not been able to keep up a job due to his underlying symptoms of memory complaints. He did tested positive for chronic Lyme disease with positive IgG antibodies but it seems that the IgM was negative ruling out any acute infection. He also had a COVID infection last year but after his symptoms had already started so I am not sure whether the COVID infection exacerbated the underlying Lyme diseas but it seems that he has already finished a course of oral doxycycline. I would recommend getting an MRI of the brain with and without contrast at [address] as well as getting an MRI of the cervical spine to rule out any involvement from rheumatoid arthritis with his cervical spine once we have the neuroimaging we can decide whether to get spinal tap to check for neuroborreliosis. In the meantime I would also recommend that the patient should follow-up with neuropsychology for his underlying memory complaints. It is also possible that underlying anxiety is complication his symptoms. Lyme borreliosis/RA:  - MRI brain with and without contrast; Future  - MRI cervical spine with and without contrast; Future  - Ambulatory referral to Neuropsychology; Future  - Cortisol; Future  - Vitamin B1, whole blood; Future  - Vitamin B12; Future  - RPR-Syphilis Screening (Total Syphilis IGG/IGM); Future  - Anti-thyroglobulin antibody; Future  - Anti-microsomal antibody; Future  - TSH, 3rd generation with Free T4 reflex;  Future    Anxiety disorder, unspecified type  Already being followed by Fco Villatoro at [place] [facility] and he has an upcoming visit with his Psychiatrist, so I would defer the decision to them about whether to start an anxiety medicine or not. Return in about 3 months (around 1/9/2024). Mr. Evie Mcdaniel was encouraged to contact our office with any questions or concerns and to contact the clinic or go to the nearest emergency room if symptoms change or worsen. I have spent a total of 75 min in reviewing and/or ordering tests, medications, or procedures, performing an examination or evaluation, reviewing pertinent history, counseling and educating the patient, referring and/or communicating with other health care professionals, documenting in the EMR and general coordination of care of Mr. Evie Mcdaniel  today. Mariana Johnson "Ceil Abram" Bridget Stanton MD.   Staff Neurologist,   Neuroimmunology and Neuroinfectious disease  10/09/23     This report has been created through the use of voice recognition/text compilation software. Typographical and content errors may occur with this process. While efforts are made to detect and correct such errors, in some cases errors will persist.  For this reason, wording in this document should be considered in the proper context and not strictly verbatim.   If, when reviewing the document, an error is discovered, please let the office know at [phone]

## 2023-10-25 ENCOUNTER — HOSPITAL ENCOUNTER (OUTPATIENT)
Facility: MEDICAL CENTER | Age: 58
Discharge: HOME | End: 2023-10-25
Payer: COMMERCIAL

## 2023-10-25 ENCOUNTER — APPOINTMENT (OUTPATIENT)
Dept: RADIOLOGY | Facility: MEDICAL CENTER | Age: 58
End: 2023-10-25
Payer: COMMERCIAL

## 2023-10-25 DIAGNOSIS — A69.20 LYME BORRELIOSIS: ICD-10-CM

## 2023-10-25 PROCEDURE — A9585 GADOBUTROL INJECTION: HCPCS | Performed by: PSYCHIATRY & NEUROLOGY

## 2023-10-25 PROCEDURE — G1004 CDSM NDSC: HCPCS

## 2023-10-25 PROCEDURE — 72156 MRI NECK SPINE W/O & W/DYE: CPT

## 2023-10-25 PROCEDURE — 70553 MRI BRAIN STEM W/O & W/DYE: CPT

## 2023-10-25 RX ORDER — GADOBUTROL 604.72 MG/ML
8 INJECTION INTRAVENOUS
Status: COMPLETED | OUTPATIENT
Start: 2023-10-25 | End: 2023-10-25

## 2023-10-25 RX ADMIN — GADOBUTROL 8 ML: 604.72 INJECTION INTRAVENOUS at 15:16

## 2023-11-03 DIAGNOSIS — E23.7 PITUITARY ABNORMALITY (HCC): Primary | ICD-10-CM

## 2023-11-03 NOTE — PROGRESS NOTES
1. Pituitary abnormality ([address])    - Ambulatory referral to Neurosurgery;  Future        Milton Torres MD.   Staff Neurologist  11/03/23   11:56 AM

## 2023-11-07 DIAGNOSIS — A69.20 LYME BORRELIOSIS: Primary | ICD-10-CM

## 2023-11-07 RX ORDER — CEFUROXIME AXETIL 500 MG/1
500 TABLET ORAL EVERY 12 HOURS SCHEDULED
Qty: 28 TABLET | Refills: 0 | Status: SHIPPED | OUTPATIENT
Start: 2023-11-07 | End: 2023-11-21

## 2023-11-16 ENCOUNTER — OFFICE VISIT (OUTPATIENT)
Dept: NEUROSURGERY | Facility: CLINIC | Age: 58
End: 2023-11-16
Payer: COMMERCIAL

## 2023-11-16 VITALS
TEMPERATURE: 98 F | OXYGEN SATURATION: 99 % | SYSTOLIC BLOOD PRESSURE: 108 MMHG | HEIGHT: 72 IN | DIASTOLIC BLOOD PRESSURE: 62 MMHG | RESPIRATION RATE: 18 BRPM | WEIGHT: 171.6 LBS | BODY MASS INDEX: 23.24 KG/M2 | HEART RATE: 61 BPM

## 2023-11-16 DIAGNOSIS — E23.7 PITUITARY ABNORMALITY (HCC): ICD-10-CM

## 2023-11-16 PROCEDURE — 99204 OFFICE O/P NEW MOD 45 MIN: CPT | Performed by: PHYSICIAN ASSISTANT

## 2023-11-16 RX ORDER — ESCITALOPRAM OXALATE 10 MG/1
10 TABLET ORAL DAILY
COMMUNITY

## 2023-11-16 NOTE — PROGRESS NOTES
Office Note - Neurosurgery   Sandra Navarrete 60 y.o. male MRN: [id number]      Assessment:  Patient is a 60years old gentleman with past medical history of Graves' disease, Lyme disease, referred by his neurologist for abnormal brain MRI. 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 Has also periventricular scattered lesions which could be inflammatory, vascular or due to demyelinating disease image was ordered for work-up of severe headache that has been going on for the past 2 to 3 years. Patient reports this additional dizziness, gait issues, occasional blurry vision and paresthesias. No nausea, vomiting, seizures, speech or swallowing issues. Completed doxycycline and currently taking ceftriaxone. Patient had visual field assessment and reports shows no visual field issues. Exam-patient alert and oriented x3. PERRL, EOMI 3 mm conjugate bilaterally, intact visual fields x4 quadrants. He moves all extremities. Strength 5/5 bilaterally and sensation to light intact throughout . DTR 2+ no clonus bilaterally. Gait unstable on heel-to-toe walking. Hx, PEx, and images reviewed with the patient and his wife management plan discussed. The pituitary lesion on the image is very small and I do not think his symptoms is related to this lesion. We will continue to monitor history lesion with images, recommend follow-up MRI including official visual field assessment and battery function tests in 6 months. Order placed. Patient is advised to call office or go to emergency room with development of new neurological symptoms. Questions and concerns were answered. Both patient and his wife expressed their understanding's and agreed with the plan.     Plan:  MRI brain with and without contrast insulin protocol in 6 months  Ambulatory prior to ophthalmology for VF evaluation  Pituitary function test  Follow-up after image results in 6 months, Shared visit Dr Donna Epps  Call office or cough up blood.     · You are not able to stand or walk or bear weight.     · Your buttocks, legs, or feet feel numb or tingly.     · Your leg or foot is cool or pale or changes color.     · You have severe pain.    Call your doctor now or seek immediate medical care if:    · You have signs of infection, such as:  ¨ Increased pain, swelling, warmth, or redness in the hip area. ¨ Red streaks leading from the hip area. ¨ Pus draining from the hip area. ¨ A fever.     · You have signs of a blood clot, such as:  ¨ Pain in your calf, back of the knee, thigh, or groin. ¨ Redness and swelling in your leg or groin.     · You are not able to bend, straighten, or move your leg normally.     · You have trouble urinating or having bowel movements.    Watch closely for changes in your health, and be sure to contact your doctor if:    · You do not get better as expected. Where can you learn more? Go to [email or web address] and sign in to your [employer] account. Enter Q971 in the MyWerx box to learn more about \"Hip Pain: Care Instructions. \"     If you do not have an account, please click on the \"Sign Up Now\" link. Current as of: November 20, 2017  Content Version: 11.7  © 7116-7419 [employer]. Care instructions adapted under license by [facility] ([facility]). If you have questions about a medical condition or this instruction, always ask your healthcare professional. [place] [address] any warranty or liability for your use of this information. go to emergency room with development of new neurological symptoms        Subjective/Objective     C/C: " Patient is referred by his neurologist for abnormal brain MRI"    HPI     Patient is a 60years old gentleman with past medical history of Graves' disease, Lyme disease, referred by his neurologist for abnormal brain MRI. Image was ordered for work-up of severe headache, and incidentally detected 1 mm hypodense lesion, some periventricular subcortical enhancing lesions. Patient reported chronic headache, associated with dizziness and lightheadedness for last 2 to 3 years. Recently blood test shows Lyme disease and that he completed a course of doxycycline and currently started on ceftriaxone. His headache improved with pain medications, but still complains about gait issues intermittent paresthesia, dizziness and headaches. He had visual tests done at the outside hospital and  he was told normal visual tests. Patient has any history of seizures, loss of interest, cognitive problem, visual field deficit, fever, chills, rigors, cough or chest pain. Denies Hx diabetes mellitus, congestive heart failure, stroke, bleeding disorder or taking anticoagulant medications. Remote history of cigarette smoker, denies drinking alcohol. ROS  Review of system personally reviewed and updated as follows  Review of Systems   HENT:  Positive for sinus pressure and tinnitus (b/l ringin- comes and goes). Eyes:  Positive for visual disturbance (double vision- occasional/ sees lines). Gastrointestinal:         H/o diverticulitis     Endocrine:        H/o radiation due to thyroid disease    Genitourinary:  Positive for frequency. Musculoskeletal:  Positive for arthralgias (h/o RA) and gait problem (due to occasionally leg stiffiness/ tightness).    Neurological:  Positive for dizziness, speech difficulty (w trouble finding words), weakness (b/l arms and b/l legs w b/l leg stiffness), light-headedness and headaches (sharp./ shocking pain/ on and off). 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 All other systems reviewed and are negative. Family History    No family history on file.     Social History    Social History     Socioeconomic History    Marital status: /Civil Union     Spouse name: Not on file    Number of children: Not on file    Years of education: Not on file    Highest education level: Not on file   Occupational History    Not on file   Tobacco Use    Smoking status: Former    Smokeless tobacco: Never   Vaping Use    Vaping Use: Never used   Substance and Sexual Activity    Alcohol use: Yes     Comment: Occasionally    Drug use: No    Sexual activity: Not on file   Other Topics Concern    Not on file   Social History Narrative    Not on file     Social Determinants of Health     Financial Resource Strain: Not on file   Food Insecurity: Not on file   Transportation Needs: Not on file   Physical Activity: Not on file   Stress: Not on file   Social Connections: Not on file   Intimate Partner Violence: Not on file   Housing Stability: Not on file       Past Medical History    Past Medical History:   Diagnosis Date    COPD (chronic obstructive pulmonary disease) ([address] [facility])     Disease of thyroid gland     graves disease    Rheumatoid arthritis ([address] [facility])        Surgical History    Past Surgical History:   Procedure Laterality Date    KNEE SURGERY      SHOULDER SURGERY         Medications      Current Outpatient Medications:     albuterol (PROVENTIL HFA,VENTOLIN HFA) 90 mcg/act inhaler, Inhale 2 puffs every 6 (six) hours as needed for wheezing, Disp: , Rfl:     budesonide-formoterol (Symbicort) 160-4.5 mcg/act inhaler, Inhale 2 puffs in the morning, Disp: , Rfl:     cefuroxime (CEFTIN) 500 mg tablet, Take 1 tablet (500 mg total) by mouth every 12 (twelve) hours for 14 days, Disp: 28 tablet, Rfl: 0    doxycycline hyclate (VIBRA-TABS) 100 mg tablet, Take 100 mg by mouth 2 (two) times a day, Disp: , Rfl:     fluticasone (FLONASE) 50 mcg/act nasal spray, 1 spray into each nostril daily, Disp: , Rfl:     hydroxychloroquine (PLAQUENIL) 200 mg tablet, Take 200 mg by mouth 2 (two) times a day, Disp: , Rfl:     levocetirizine (XYZAL) 5 MG tablet, Take 5 mg by mouth daily at bedtime as needed, Disp: , Rfl:     levothyroxine 125 mcg tablet, Take 125 mcg by mouth daily, Disp: , Rfl:     meloxicam (MOBIC) 15 mg tablet, Take 15 mg by mouth if needed, Disp: , Rfl:     montelukast (SINGULAIR) 10 mg tablet, Take 10 mg by mouth daily at bedtime as needed, Disp: , Rfl:     Omeprazole (PRILOSEC PO), Take 20 mg by mouth daily, Disp: , Rfl:     ondansetron (Zofran ODT) 4 mg disintegrating tablet, Take 1 tablet (4 mg total) by mouth every 8 (eight) hours as needed for nausea for up to 15 doses (Patient not taking: Reported on 10/9/2023), Disp: 15 tablet, Rfl: 0    oxyCODONE-acetaminophen (PERCOCET) 5-325 mg per tablet, Take 1 tablet by mouth every 4 (four) hours as needed for moderate pain Max Daily Amount: 6 tablets (Patient not taking: Reported on 10/9/2023), Disp: 20 tablet, Rfl: 0    predniSONE 10 mg tablet, Take 10 mg by mouth if needed, Disp: , Rfl:     Allergies    Allergies   Allergen Reactions    Aminophylline Other (See Comments)    Pollen Extract Other (See Comments)    Theophyllines        The following portions of the patient's history were reviewed and updated as appropriate: allergies, current medications, past family history, past medical history, past social history, past surgical history, and problem list.    Investigations    I personally reviewed the MRI results with the patient:        Physical Exam    Vitals:    11/16/23 1336   Resp: 18       Physical Exam  HENT:      Head: Normocephalic and atraumatic. Eyes:      Extraocular Movements: Extraocular movements intact. Pupils: Pupils are equal, round, and reactive to light. Cardiovascular:      Rate and Rhythm: Normal rate and regular rhythm. Pulmonary:      Effort: Pulmonary effort is normal.   Musculoskeletal:         General: Normal range of motion. Cervical back: Normal range of motion. Neurological:      Mental Status: He is alert and oriented to person, place, and time. Gait: Gait abnormal.      Deep Tendon Reflexes:      Reflex Scores:       Tricep reflexes are 2+ on the right side and 2+ on the left side. Bicep reflexes are 2+ on the right side and 2+ on the left side. Brachioradialis reflexes are 2+ on the right side and 2+ on the left side. Patellar reflexes are 2+ on the right side and 2+ on the left side. Achilles reflexes are 2+ on the right side and 2+ on the left side. Psychiatric:         Speech: Speech normal.       Neurologic Exam     Mental Status   Oriented to person, place, and time. Speech: speech is normal   Level of consciousness: alert    Cranial Nerves     CN III, IV, VI   Pupils are equal, round, and reactive to light. Right pupil: Size: 2 mm. Left pupil: Size: 2 mm.      CN XI   CN XI normal.     Motor Exam   Muscle bulk: normal  Overall muscle tone: normal  Right arm tone: normal  Left arm tone: normal  Right arm pronator drift: absent  Left arm pronator drift: absent  Right leg tone: normal  Left leg tone: normal    Sensory Exam   Light touch normal.     Gait, Coordination, and Reflexes     Reflexes   Right brachioradialis: 2+  Left brachioradialis: 2+  Right biceps: 2+  Left biceps: 2+  Right triceps: 2+  Left triceps: 2+  Right patellar: 2+  Left patellar: 2+  Right achilles: 2+  Left achilles: 2+  Right : 2+  Left : 2+  Right Andres: absent  Left Andres: absent  Right ankle clonus: absent  Left ankle clonus: absent

## 2023-12-05 ENCOUNTER — TELEPHONE (OUTPATIENT)
Dept: INFECTIOUS DISEASES | Facility: CLINIC | Age: 58
End: 2023-12-05

## 2023-12-05 NOTE — TELEPHONE ENCOUNTER
Patient calls the office today. Patient states he is calling to make an appointment to be seen by Infectious Disease. Informed patient he would need a referral placed by a provider to be seen. Patient verbalizes understanding at this time.

## 2024-01-04 ENCOUNTER — TELEPHONE (OUTPATIENT)
Dept: NEUROLOGY | Facility: CLINIC | Age: 59
End: 2024-01-04

## 2024-01-09 ENCOUNTER — OFFICE VISIT (OUTPATIENT)
Dept: NEUROLOGY | Facility: CLINIC | Age: 59
End: 2024-01-09
Payer: COMMERCIAL

## 2024-01-09 VITALS
HEIGHT: 72 IN | HEART RATE: 58 BPM | BODY MASS INDEX: 24 KG/M2 | WEIGHT: 177.2 LBS | SYSTOLIC BLOOD PRESSURE: 110 MMHG | DIASTOLIC BLOOD PRESSURE: 80 MMHG

## 2024-01-09 DIAGNOSIS — A69.20 LYME BORRELIOSIS: Primary | ICD-10-CM

## 2024-01-09 PROCEDURE — 99215 OFFICE O/P EST HI 40 MIN: CPT | Performed by: PSYCHIATRY & NEUROLOGY

## 2024-01-09 NOTE — PROGRESS NOTES
NEUROLOGY OUTPATIENT - FOLLOW UP PATIENT VISIT NOTE        NAME: Basil Molina  MRN: [id number]  : 1965     TODAY'S DATE: 24         HISTORY OF PRESENT ILLNESS (HPI):    Mr. Molina is a 58 y.o. male presenting with follow up regarding the Lyme borreliosis      INTERIM HISTORY:  42 days of Doxycyline, 2 weeks of antibiotics (Cefuroxime), which seems to have helped with his symptoms.     Dr. Parks Rheumatologist did not consider more anti biotics.     He was also seen by the neurosurgery and has another MRI of the brain with looking at the pitutary.     Saw Psychiatrist and was started on  (Lexpro) 15 mg --he feels that he is over medicated as he feels too good.     He went back to work and feels good about it. No other neurological concerns at this time.        PRIOR NOTES:  Description: Brain fog--having problems with focussing and would lose train of thoughts.     He mentions that there are also train of thoughts. He never had these issues before but they seems to be getting worse. He is not sure but mentions that it might have started a couple of years back. He plays a lot of golf and works outside as well. He does remember that he has taken a few tick bites but never mentions that he never had any tick bite.     About 1.5 years back he told his wife that he is getting his memory affected. He mentions that these symptoms have affected short term memory complaints. No problems with his finances. He had a home equity loan due shortly. No hallucinations but some times he is having out of body experiences as well. He is not sure whether these happen close to his bed time. Never lost consciousness. No problems with driving but he will miss some of the exits but he thinks that his mind is going in different direction. Multi tasking is an issue. He was very good multi sabi but not any more.He feels that there is some problem with disinhibition and he would have frequent anger out bursts as well. He was  also not able to keep the last couple of jobs possible secondary to his memory complaints. He works as a .       He was also diagnosed with COVID infection (last March) after his symptoms had started. Never got vaccinated as he had been using hydro chlorquine for his underlying RA. No concussions, but he had a few head smacking at work.     He feels that his life is affected and it is even affecting the balance, which he began to notice around spring time. No jayde falls but he had a few near falls experience. He used to play a lot of golf but has been only played about a couple of times in the last few months due to his balance complaints.     Stress at home and financially. Around Jan and Feb of this year something might have snapped and  might have caused these symptoms to trigger and may have bought out his Lyme disease. Wife had been addicted to pain killers and has been to rehab. She had been to rehab for alcohol and drugs. He also has a deadline coming up for a home equity loan.     He is also having some tingling feeling all over body.  He reports that the numbness is affecting all of his extremities.  Previously he was blaming that on the uncomfortable seat of his car but feels that the symptoms are not related with that and can happen without any trigger.  He mentions that the symptoms might have started a couple of months back and denies having any weakness but does report a lot of stiffness.     He walks a couple of miles every day with his dog. He feel that his legs are tight he never gets any relaxation. He also feels very stiff. He does think it is fatigue but there is no relaxation.     Vision problems (loss of vision or double vision): No new symptoms reported  Ambulatory dysfunction: No new symptoms reported  Vertigo and Dizziness: a couple of days back, he had an episode of vertigo  Depression: At baseline, these symptoms are present --no SI or HI. He is going to a  therapist and they are recommending a medicine for his depression. [facility]   Anxiety: At baseline, these symptoms are present   Fatigue: no symptoms.   Bladder symptoms: sometime he is having hesitancy   Bowel symptoms: at baseline he has diverticulitis        Fever: he gets warm in the afternoon, but never feels feverish. He has not checked his temperature.   Weight loss: Yes, 225 pounds (5 years back) and now he weighs 172. Underwent colonoscopy 2 year back.   Skin rashes: No  Dry eyes: No  Dry mouth: No      Mother has Parkinson disease and early dementia. (84 year old), some days are worse than others.    OUTSIDE RECORDS:    Dr. Kina [facility], [facility]    Basil Molina is a 58 y.o. male who presents for follow up visit for RA, chronic Lyme disease.   Patient states he went to see his new PCP about a month ago. He reported that he was not feeling well. He had brain fog, fatigue, generalized achiness. His new PCP ordered additional testing which revealed positive Lyme Western blot IgG. His inflammatory marker was elevated as well, CRP was 45. He was treated with doxycycline as well as prednisone. Most recent CRP normalized. He continues to overall have achiness and is unable to work on due to his pains and fatigue. He has a physical job.  Previously, he had right hand trigger finger surgery with Dr. Humphrey. ever since using a heavy drill. He states he had CT wrist which was unremarkable.   He has minimal AM stiffness. He does report numbness/tingling in his hands in the middle of night.   He did have pneumonia in January 2019.  He also had left TKA previously and doing wel..  He believes paternal grandmother had some type of arthritis.   He does report history of recurrent sinusitis.  He had blood work in January 2018 -negative RENATE, ESR,Lyme screen.  had history of + CCP, most recent one was ngeative.     CURRENT OUTPATIENT MEDICATIONS:    Basil Molina has a  current medication list which includes the following prescription(s): albuterol, budesonide-formoterol, doxycycline hyclate, escitalopram, fluticasone, hydroxychloroquine, levocetirizine, levothyroxine, meloxicam, montelukast, omeprazole magnesium, ondansetron, oxycodone-acetaminophen, and prednisone.       PHYSICAL EXAMINATION:   VITAL SIGNS:  height is 6' (1.829 m) and weight is 80.4 kg (177 lb 3.2 oz). His blood pressure is 110/80 and his pulse is 58.        NEUROLOGICAL EXAMINATION:    MENTAL STATUS EXAM: Alert, oriented to time place and person     CRANIAL NERVE EXAMINATION:  I Not tested   II Normal visual fields to finger counting.   II, Ill,  IV, VI Pupils were symmetric, briskly reactive. No afferent pupillary defect.  Eye movements are full without nystagmus. No ptosis.   V Facial sensation were intact   VII Facial movements were symmetrical    VIII Hearing is normal to finger rub.   IX, X Intact   XI Shoulder shrug and head turn is normal   XII Tongue protrusion is in the mid line.          MOTOR EXAM:        Arm Right  Left Leg Right  Left   Deltoid 5/5 5/5 lliopsoas 5/5 5/5   Biceps 5/5 5/5 Quads 5/5 5/5   Triceps 5/5 5/5 Hamstrings 5/5 5/5   Wrist Extension 5/5 5/5 Ankle Dorsi Flexion 5/5 5/5   Wrist Flexion 5/5 5/5 Ankle Plantar Flexion 5/5 5/5            DEEP TENDON REFLEXES:     Brachioradialis Biceps Triceps Patellar Achilles   Right +2 +2 +2 +1 +1   Left +2 +2 +2 +1 +1            SENSORY EXAMINATION:   Sensation to dull touch and temperature is decreased on the right side     COORDINATION:   normal finger to nose testing with some dysmetria on the left side.      GAIT/STATION:   normal        DIAGNOSTIC STUDIES:   PERTINENT LABS:     Lab Results   Component Value Date    WBC 9.37 02/22/2022     Lab Results   Component Value Date    HGB 14.4 02/22/2022     Lab Results   Component Value Date     02/22/2022     Lab Results   Component Value Date    LYMPHSABS 1.90 02/22/2022     No results  "found for: \"LABLYMP\"  Lab Results   Component Value Date    EOSABS 0.10 02/22/2022       Lab Results   Component Value Date    CREATININE 0.93 02/22/2022     Lab Results   Component Value Date    AST 11 02/22/2022     Lab Results   Component Value Date    ALT 19 02/22/2022     Lab Results   Component Value Date    ALKPHOS 50 02/22/2022     Lab Results   Component Value Date    AST 11 02/22/2022               NEUROIMAGING:  Results for orders placed during the hospital encounter of 10/25/23    MRI brain with and without contrast    Impression  Nonspecific T2/FLAIR signal normalities within the periventricular and subcortical white matter. Differential includes infectious process including Lyme disease, chronic microangiopathic changes, demyelinating disease, sequelae of migraines among other  etiologies.    Apparent intrinsic T1 hyperintensity within the cerebellar folia inferiorly, which may be artifactual (7:5). No definite enhancement.    1 mm foci of hypoenhancement within the midline pituitary gland. Differential includes Rathke's cleft cyst versus microadenoma. Correlate with lab values and clinical history. Follow-up in 6 months is recommended.    Workstation performed: [id number]  No prior neuroimaging done in the system     Results for orders placed during the hospital encounter of 10/25/23    MRI cervical spine with and without contrast    Impression  Apparent T2 signal abnormality within the cord from C5-C7 on sagittal STIR (4:8) is not seen on axial sequences and may be artifactual. No abnormal enhancement of the cervical cord.    Spondylosis of the cervical spine including mild spinal canal narrowing at C6-C7 and C7-T1. Left subarticular disc protrusion at C6-C7, encroaching/impingng the transiting nerve roots.    No greater than mild to moderate neuroforaminal narrowing as detailed above.        Workstation performed: [id number]        ASSESSMENT AND PLAN:    Mr. Molina is a 58 y.o.male  presenting with " "follow-up regarding his Lyme disease.  He underwent MRI of the brain and cervical spine both of which came back without any significant findings.  All of his symptoms seem to have been resolved since finishing up the 2 courses of antibiotics and starting the Lexapro .         Lyme borreliosis  No neurological findings of Lyme disease found on the imaging studies (reviewed with Mr. Molina) and as his symptoms are improving would not recommend any further studies or imaging.    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     Mr. Molina was encouraged to contact our office with any questions or concerns and to contact the clinic or go to the nearest emergency room if symptoms change or worsen.      I have spent a total of 41 min in reviewing and/or ordering tests, medications, or procedures, performing an examination or evaluation, reviewing pertinent history, counseling and educating the patient, referring and/or communicating with other health care professionals, documenting in the EMR and general coordination of care of Mr. Molina  today.           Eleni \"Adithya\" MD Ronaldo.   Staff Neurologist,   Neuroimmunology and Neuroinfectious disease  01/09/24     This report has been created through the use of voice recognition/text compilation software.  Typographical and content errors may occur with this process.  While efforts are made to detect and correct such errors, in some cases errors will persist.  For this reason, wording in this document should be considered in the proper context and not strictly verbatim.  If, when reviewing the document, an error is discovered, please let the office know at [phone]                "

## 2024-01-18 ENCOUNTER — TELEPHONE (OUTPATIENT)
Dept: PSYCHIATRY | Facility: CLINIC | Age: 59
End: 2024-01-18

## 2024-01-18 NOTE — TELEPHONE ENCOUNTER
Reached out to patient in regards to Neuropsychology referral.    Unable to LVM for pt to contact intake department,phone line rang and then disconnected.

## 2024-01-24 NOTE — TELEPHONE ENCOUNTER
Reached out to patient in regards to neuropsychology referral, lvm to contact intake department.     When pt returns call, will need to be referred out to [facility] / [facility] or [facility]. Provider does not feel comfortable treating Lyme borreliosis.

## 2024-02-29 ENCOUNTER — TELEPHONE (OUTPATIENT)
Dept: NEUROSURGERY | Facility: CLINIC | Age: 59
End: 2024-02-29

## 2024-02-29 NOTE — TELEPHONE ENCOUNTER
Received in basket message from Uma to contact Dr. Bruner office for an appointment orders were faxed to [phone] spoke to Tawnya at office she stated her office reached out to patient on 1/31/24 to schedule him an appointment but the patient did not respond back to be scheduled.

## 2024-03-25 ENCOUNTER — TELEPHONE (OUTPATIENT)
Age: 59
End: 2024-03-25

## 2024-03-25 NOTE — TELEPHONE ENCOUNTER
Called patient left message re: upcoming appointment with  on 6/11/24, let pt know will be at [place] office. Appointment reminder card mailed and call back number given.

## 2024-04-01 ENCOUNTER — DOCUMENTATION (OUTPATIENT)
Dept: CARDIOLOGY CLINIC | Facility: CLINIC | Age: 59
End: 2024-04-01

## 2024-05-16 ENCOUNTER — HOSPITAL ENCOUNTER (OUTPATIENT)
Facility: MEDICAL CENTER | Age: 59
Discharge: HOME | End: 2024-05-16
Payer: COMMERCIAL

## 2024-05-16 DIAGNOSIS — E23.7 PITUITARY ABNORMALITY (HCC): ICD-10-CM

## 2024-05-16 PROCEDURE — 70553 MRI BRAIN STEM W/O & W/DYE: CPT

## 2024-05-16 PROCEDURE — G1004 CDSM NDSC: HCPCS

## 2024-05-16 PROCEDURE — A9585 GADOBUTROL INJECTION: HCPCS | Performed by: PHYSICIAN ASSISTANT

## 2024-05-16 RX ORDER — GADOBUTROL 604.72 MG/ML
8 INJECTION INTRAVENOUS
Status: COMPLETED | OUTPATIENT
Start: 2024-05-16 | End: 2024-05-16

## 2024-05-16 RX ADMIN — GADOBUTROL 8 ML: 604.72 INJECTION INTRAVENOUS at 16:49

## 2024-05-20 ENCOUNTER — TELEPHONE (OUTPATIENT)
Dept: NEUROSURGERY | Facility: CLINIC | Age: 59
End: 2024-05-20

## 2024-05-20 NOTE — TELEPHONE ENCOUNTER
Patient needs to complete PIT labs and see opham w [facility] prior to folllow up snpx with LH on 5/23    Sent [employer] message and EPIC text message for patient to cb to confirm the following will or has been completed for f/u     Pt scheduled for [facility] Opht   5/28- reschedule is needed to after

## 2024-05-21 ENCOUNTER — TELEPHONE (OUTPATIENT)
Dept: NEUROSURGERY | Facility: CLINIC | Age: 59
End: 2024-05-21

## 2024-05-21 NOTE — TELEPHONE ENCOUNTER
SPOKE W PATIENT WHO STATED HIS OPHTA W [facility] GOT RENALDO TO 7/2024      LABS WERE DONE OUTSIDE OF [facility]- THROUGH [facility]- PT TO BRING RECORDS   ( PROVIDED OUR FAX # TO HAVE LABS SENT OVER)     PT STATED HE WANTS TO KEEP APPT AS SCHEDULE TO REVIEW IMAGES     SENT INBASKET TO ERNESTINA/ERNIE FOR REVIEW

## 2024-05-21 NOTE — TELEPHONE ENCOUNTER
PLEASE ADVISE  Received: Today  Magali Nuno PA-C  Cc: Charly Pearl MD  Patient is on the schedule for snpx f/u with MRI Brain- repeat LABS and VF    Patient stated appt for VF got tasha to 7/2024. Patient wants to keep appt to review images. I advised patient to bring copy of labs done through outside PCP.

## 2024-05-23 ENCOUNTER — OFFICE VISIT (OUTPATIENT)
Dept: NEUROSURGERY | Facility: CLINIC | Age: 59
End: 2024-05-23
Payer: COMMERCIAL

## 2024-05-23 VITALS
RESPIRATION RATE: 16 BRPM | WEIGHT: 168.6 LBS | DIASTOLIC BLOOD PRESSURE: 60 MMHG | OXYGEN SATURATION: 97 % | TEMPERATURE: 98.5 F | HEIGHT: 72 IN | HEART RATE: 59 BPM | SYSTOLIC BLOOD PRESSURE: 102 MMHG | BODY MASS INDEX: 22.84 KG/M2

## 2024-05-23 DIAGNOSIS — E23.7 PITUITARY LESION (HCC): Primary | ICD-10-CM

## 2024-05-23 PROCEDURE — 99213 OFFICE O/P EST LOW 20 MIN: CPT | Performed by: PHYSICIAN ASSISTANT

## 2024-05-23 NOTE — PROGRESS NOTES
Office Note - Neurosurgery   Basil Molina 59 y.o. male MRN: [id number]      Assessment:    Patient is a 59 yrs old pleasant gentleman with PMHx of Grave's disease, Lyme disease, here today for 6 months follow up of 1mm pituitary lesion , detected during workups for Lyme disease about 7-8 months ago.  Patient reports that his previous symptoms with foggy brain, headache, memory issues, gait problem, and generalized weakness remarkably improved after he completed antibiotics for Lyme disease treatment.  Now he does not have any complaints.  He is back to his work.  Denies any headache, nausea, vomiting, vision issues, memory problem, weakness in the extremities, bowel or bladder dysfunction.  No seizures, speech or swallowing problems.  Saw ophthalmologist and had visual field test done which is unremarkable.  PFTs was ordered in 2023 but was not done.    Exam-patient alert and oriented x 3.  PERRL, EOMI x 3 conjugate bilaterally.  Speech fluent.  Moves all extremities.  Finger-to-nose test is normal without drift bilaterally.  Sensation to light touch intact throughout.. DTR 2+ no clonus bilaterally.  Gait stable on heel-to-toe walking.    Hx, Exam and images reviewed with the patient. Mx plan discussed.  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  MRI of brain, pituitary protocol for 1 year follow-up order placed, BUN and creatinine, up-to-date pituitary function test order placed.  Will see the patient after 1 year with image.  Advised patient to call office or go to emergency room with interval development of neurological symptoms.  All questions and concerns were answered to patient's satisfaction.  Patient expressed his  understanding's and agreed with the plan.      Plan:  MRI of brain w/wo contrast, Pituitary protocol  PFTs  Bun &  "Creatinine  F/U after images results, in one year, shared visit Dr Pearl  Call with question or concern      Subjective/Objective     C/C: \" 6 month follow up for pituitary lesion\"    HPI  See detailed discussion above  ROS  Review of system personally reviewed and updated as follows  Review of Systems   HENT:  Positive for tinnitus (occa-b/l ringin- comes and goes). Negative for sinus pressure.    Eyes:  Positive for visual disturbance (better).   Gastrointestinal:         H/o diverticulitis     Endocrine:        H/o radiation due to thyroid disease    Genitourinary:  Negative for frequency.   Musculoskeletal:  Positive for arthralgias (h/o RA) and gait problem (due to occasionally leg stiffiness/ tightness).   Neurological:  Positive for speech difficulty (Lil better-w trouble finding words), weakness (b/l arms and b/l legs w b/l leg stiffness) and numbness (occa). Negative for dizziness, light-headedness and headaches (better- no new HA episodes).        FOLLOW UP SNPX ERNESTINA/DR PEARL MRI BRAIN PIT SL 5/16/24  Neurology last ov     OPTHAL W [facility] 5/21 PT BRING RECORD OF NOTES     LABS ( SOME LABS STILL NOT AVAIL)      Psychiatric/Behavioral:  Positive for confusion (somewhat better-memory loss) and decreased concentration (somewhat better).    All other systems reviewed and are negative.      Family History    No family history on file.    Social History    Social History     Socioeconomic History    Marital status: /Civil Union     Spouse name: Not on file    Number of children: Not on file    Years of education: Not on file    Highest education level: Not on file   Occupational History    Not on file   Tobacco Use    Smoking status: Former    Smokeless tobacco: Never   Vaping Use    Vaping status: Never Used   Substance and Sexual Activity    Alcohol use: Yes     Comment: Occasionally    Drug use: Not Currently    Sexual activity: Not on file   Other Topics Concern    Not on file   Social History Narrative "    Not on file     Social Determinants of Health     Financial Resource Strain: Not on file   Food Insecurity: Not on file   Transportation Needs: Not on file   Physical Activity: Not on file   Stress: Not on file   Social Connections: Not on file   Intimate Partner Violence: Not on file   Housing Stability: Not on file       Past Medical History    Past Medical History:   Diagnosis Date    COPD (chronic obstructive pulmonary disease) (HCC)     Disease of thyroid gland     graves disease    Rheumatoid arthritis (HCC)        Surgical History    Past Surgical History:   Procedure Laterality Date    FOOT SURGERY Left     KNEE SURGERY      SHOULDER SURGERY         Medications      Current Outpatient Medications:     albuterol (PROVENTIL HFA,VENTOLIN HFA) 90 mcg/act inhaler, Inhale 2 puffs every 6 (six) hours as needed for wheezing, Disp: , Rfl:     budesonide-formoterol (Symbicort) 160-4.5 mcg/act inhaler, Inhale 2 puffs in the morning, Disp: , Rfl:     doxycycline hyclate (VIBRA-TABS) 100 mg tablet, Take 100 mg by mouth 2 (two) times a day (Patient not taking: Reported on 11/16/2023), Disp: , Rfl:     escitalopram (LEXAPRO) 10 mg tablet, Take 10 mg by mouth daily, Disp: , Rfl:     fluticasone (FLONASE) 50 mcg/act nasal spray, 1 spray into each nostril daily, Disp: , Rfl:     hydroxychloroquine (PLAQUENIL) 200 mg tablet, Take 200 mg by mouth 2 (two) times a day, Disp: , Rfl:     levocetirizine (XYZAL) 5 MG tablet, Take 5 mg by mouth daily at bedtime as needed, Disp: , Rfl:     levothyroxine 125 mcg tablet, Take 125 mcg by mouth daily, Disp: , Rfl:     meloxicam (MOBIC) 15 mg tablet, Take 15 mg by mouth if needed (Patient not taking: Reported on 11/16/2023), Disp: , Rfl:     montelukast (SINGULAIR) 10 mg tablet, Take 10 mg by mouth daily at bedtime as needed, Disp: , Rfl:     Omeprazole (PRILOSEC PO), Take 20 mg by mouth daily, Disp: , Rfl:     ondansetron (Zofran ODT) 4 mg disintegrating tablet, Take 1 tablet (4 mg  total) by mouth every 8 (eight) hours as needed for nausea for up to 15 doses (Patient not taking: Reported on 10/9/2023), Disp: 15 tablet, Rfl: 0    oxyCODONE-acetaminophen (PERCOCET) 5-325 mg per tablet, Take 1 tablet by mouth every 4 (four) hours as needed for moderate pain Max Daily Amount: 6 tablets (Patient not taking: Reported on 10/9/2023), Disp: 20 tablet, Rfl: 0    predniSONE 10 mg tablet, Take 10 mg by mouth if needed, Disp: , Rfl:     Allergies    Allergies   Allergen Reactions    Aminophylline Other (See Comments)    Pollen Extract Other (See Comments)    Theophyllines        The following portions of the patient's history were reviewed and updated as appropriate: allergies, current medications, past family history, past medical history, past social history, past surgical history, and problem list.    Investigations    I personally reviewed the MRI results with the patient:        Physical Exam    Vitals:    05/23/24 1340   Resp: 16       Physical Exam  Constitutional:       Appearance: Normal appearance.   HENT:      Head: Normocephalic and atraumatic.   Eyes:      Extraocular Movements: Extraocular movements intact and EOM normal.      Pupils: Pupils are equal, round, and reactive to light.   Cardiovascular:      Rate and Rhythm: Normal rate.   Pulmonary:      Effort: Pulmonary effort is normal.   Musculoskeletal:         General: Normal range of motion.      Cervical back: Normal range of motion.   Neurological:      General: No focal deficit present.      Mental Status: He is alert and oriented to person, place, and time.      Coordination: Finger-Nose-Finger Test normal.      Deep Tendon Reflexes:      Reflex Scores:       Tricep reflexes are 2+ on the right side and 2+ on the left side.       Bicep reflexes are 2+ on the right side and 2+ on the left side.       Brachioradialis reflexes are 2+ on the right side and 2+ on the left side.       Patellar reflexes are 2+ on the right side and 2+ on the  left side.       Achilles reflexes are 2+ on the right side and 2+ on the left side.  Psychiatric:         Speech: Speech normal.       Neurologic Exam     Mental Status   Oriented to person, place, and time.   Speech: speech is normal   Level of consciousness: alert    Cranial Nerves     CN III, IV, VI   Pupils are equal, round, and reactive to light.  Extraocular motions are normal.   Right pupil: Size: 3 mm. Shape: regular. Reactivity: brisk.   Left pupil: Size: 3 mm. Shape: regular. Reactivity: brisk.     CN XI   CN XI normal.     Motor Exam   Muscle bulk: normal  Overall muscle tone: normal  Right arm tone: normal  Left arm tone: normal  Right arm pronator drift: absent  Left arm pronator drift: absent  Right leg tone: normal  Left leg tone: normal    Sensory Exam   Light touch normal.     Gait, Coordination, and Reflexes     Coordination   Finger to nose coordination: normal    Reflexes   Right brachioradialis: 2+  Left brachioradialis: 2+  Right biceps: 2+  Left biceps: 2+  Right triceps: 2+  Left triceps: 2+  Right patellar: 2+  Left patellar: 2+  Right achilles: 2+  Left achilles: 2+  Right : 2+  Left : 2+  Right Andres: absent  Left Andres: absent  Right ankle clonus: absent  Left ankle clonus: absent

## 2024-06-11 ENCOUNTER — OFFICE VISIT (OUTPATIENT)
Age: 59
End: 2024-06-11
Payer: COMMERCIAL

## 2024-06-11 VITALS
HEART RATE: 60 BPM | WEIGHT: 166 LBS | DIASTOLIC BLOOD PRESSURE: 52 MMHG | BODY MASS INDEX: 22.48 KG/M2 | HEIGHT: 72 IN | SYSTOLIC BLOOD PRESSURE: 105 MMHG | OXYGEN SATURATION: 99 %

## 2024-06-11 DIAGNOSIS — M06.9 RHEUMATOID ARTHRITIS (HCC): Primary | ICD-10-CM

## 2024-06-11 DIAGNOSIS — E23.7 PITUITARY ABNORMALITY (HCC): ICD-10-CM

## 2024-06-11 PROCEDURE — 99215 OFFICE O/P EST HI 40 MIN: CPT | Performed by: PSYCHIATRY & NEUROLOGY

## 2024-06-11 RX ORDER — ARIPIPRAZOLE 2 MG/1
2 TABLET ORAL EVERY MORNING
COMMUNITY
Start: 2024-06-01

## 2024-06-11 NOTE — PROGRESS NOTES
NEUROLOGY OUTPATIENT - FOLLOW UP PATIENT VISIT NOTE        NAME: Basil Molina  MRN: [id number]  : 1965     TODAY'S DATE: 24         HISTORY OF PRESENT ILLNESS (HPI):    Mr. Molina is a 59 y.o. male presenting with follow up regarding the Lyme Disease      INTERIM HISTORY:  He mentions that his symptoms are better since the last time. He mentions that the weight is still going down and is being followed by Dr. Celso Hope, DO     He is still walking about 2.5 miles walk a day. Numbness in the feet is better, bending down makes him feel dizziness (low BP) , no problems with speech, but he does have some memory complaints as he gets re directed to do other things. He has been reading a lot of books. Found to have low B 12 on lab work up.     No other neurological symptoms at the moment as per the patient.     PRIOR NOTES:  2024:   42 days of Doxycyline, 2 weeks of antibiotics (Cefuroxime), which seems to have helped with his symptoms.     Dr. Parks Rheumatologist did not consider more anti biotics.     He was also seen by the neurosurgery and has another MRI of the brain with looking at the pitutary.     Saw Psychiatrist and was started on  (Lexpro) 15 mg --he feels that he is over medicated as he feels too good.     He went back to work and feels good about it. No other neurological concerns at this time.      10/9/2023:   Description: Brain fog--having problems with focussing and would lose train of thoughts.     He mentions that there are also train of thoughts. He never had these issues before but they seems to be getting worse. He is not sure but mentions that it might have started a couple of years back. He plays a lot of golf and works outside as well. He does remember that he has taken a few tick bites but never mentions that he never had any tick bite.     About 1.5 years back he told his wife that he is getting his memory affected. He mentions that these symptoms have affected short  term memory complaints. No problems with his finances. He had a home equity loan due shortly. No hallucinations but some times he is having out of body experiences as well. He is not sure whether these happen close to his bed time. Never lost consciousness. No problems with driving but he will miss some of the exits but he thinks that his mind is going in different direction. Multi tasking is an issue. He was very good multi sabi but not any more.He feels that there is some problem with disinhibition and he would have frequent anger out bursts as well. He was also not able to keep the last couple of jobs possible secondary to his memory complaints. He works as a .       He was also diagnosed with COVID infection (last March) after his symptoms had started. Never got vaccinated as he had been using hydro chlorquine for his underlying RA. No concussions, but he had a few head smacking at work.     He feels that his life is affected and it is even affecting the balance, which he began to notice around spring time. No jayde falls but he had a few near falls experience. He used to play a lot of golf but has been only played about a couple of times in the last few months due to his balance complaints.     Stress at home and financially. Around Jan and Feb of this year something might have snapped and  might have caused these symptoms to trigger and may have bought out his Lyme disease. Wife had been addicted to pain killers and has been to rehab. She had been to rehab for alcohol and drugs. He also has a deadline coming up for a home equity loan.     He is also having some tingling feeling all over body.  He reports that the numbness is affecting all of his extremities.  Previously he was blaming that on the uncomfortable seat of his car but feels that the symptoms are not related with that and can happen without any trigger.  He mentions that the symptoms might have started a couple of months  back and denies having any weakness but does report a lot of stiffness.     He walks a couple of miles every day with his dog. He feel that his legs are tight he never gets any relaxation. He also feels very stiff. He does think it is fatigue but there is no relaxation.     Vision problems (loss of vision or double vision): No new symptoms reported  Ambulatory dysfunction: No new symptoms reported  Vertigo and Dizziness: a couple of days back, he had an episode of vertigo  Depression: At baseline, these symptoms are present --no SI or HI. He is going to a therapist and they are recommending a medicine for his depression. [facility]   Anxiety: At baseline, these symptoms are present   Fatigue: no symptoms.   Bladder symptoms: sometime he is having hesitancy   Bowel symptoms: at baseline he has diverticulitis        Fever: he gets warm in the afternoon, but never feels feverish. He has not checked his temperature.   Weight loss: Yes, 225 pounds (5 years back) and now he weighs 172. Underwent colonoscopy 2 year back.   Skin rashes: No  Dry eyes: No  Dry mouth: No      Mother has Parkinson disease and early dementia. (84 year old), some days are worse than others.    OUTSIDE RECORDS:    [facility]ina Parks[facility]    Basil Molina is a 58 y.o. male who presents for follow up visit for RA, chronic Lyme disease.   Patient states he went to see his new PCP about a month ago. He reported that he was not feeling well. He had brain fog, fatigue, generalized achiness. His new PCP ordered additional testing which revealed positive Lyme Western blot IgG. His inflammatory marker was elevated as well, CRP was 45. He was treated with doxycycline as well as prednisone. Most recent CRP normalized. He continues to overall have achiness and is unable to work on due to his pains and fatigue. He has a physical job.  Previously, he had right hand trigger finger surgery with Dr. Humphrey. ever  since using a heavy drill. He states he had CT wrist which was unremarkable.   He has minimal AM stiffness. He does report numbness/tingling in his hands in the middle of night.   He did have pneumonia in January 2019.  He also had left TKA previously and doing wel..  He believes paternal grandmother had some type of arthritis.   He does report history of recurrent sinusitis.  He had blood work in January 2018 -negative RENATE, ESR,Lyme screen.  had history of + CCP, most recent one was ngeative.     CURRENT OUTPATIENT MEDICATIONS:    Basil Molina has a current medication list which includes the following prescription(s): albuterol, budesonide-formoterol, doxycycline hyclate, escitalopram, fluticasone, hydroxychloroquine, levocetirizine, levothyroxine, meloxicam, montelukast, omeprazole magnesium, ondansetron, oxycodone-acetaminophen, and prednisone.       PHYSICAL EXAMINATION:   VITAL SIGNS:  height is 6' (1.829 m) and weight is 75.3 kg (166 lb). His blood pressure is 105/52 and his pulse is 60. His oxygen saturation is 99%.        NEUROLOGICAL EXAMINATION:    MENTAL STATUS EXAM: Alert, oriented to time place and person     CRANIAL NERVE EXAMINATION:  I Not tested   II Normal visual fields to finger counting.   II, Ill,  IV, VI Pupils were symmetric, briskly reactive. No afferent pupillary defect.  Eye movements are full without nystagmus. No ptosis.   V Facial sensation were intact   VII Facial movements were symmetrical    VIII Hearing is normal to finger rub.   IX, X Intact   XI Shoulder shrug and head turn is normal   XII Tongue protrusion is in the mid line.          MOTOR EXAM:        Arm Right  Left Leg Right  Left   Deltoid 5/5 5/5 lliopsoas 5/5 5/5   Biceps 5/5 5/5 Quads 5/5 5/5   Triceps 5/5 5/5 Hamstrings 5/5 5/5   Wrist Extension 5/5 5/5 Ankle Dorsi Flexion 5/5 5/5   Wrist Flexion 5/5 5/5 Ankle Plantar Flexion 5/5 5/5            DEEP TENDON REFLEXES:     Brachioradialis Biceps Triceps Patellar Achilles  "  Right +2 +2 +2 +1 +1   Left +2 +2 +2 +1 +1            SENSORY EXAMINATION:   Sensation to dull touch and temperature is decreased on the right side     COORDINATION:   normal finger to nose testing with some dysmetria on the left side.      GAIT/STATION:   normal        DIAGNOSTIC STUDIES:   PERTINENT LABS:     Lab Results   Component Value Date    WBC 9.37 02/22/2022     Lab Results   Component Value Date    HGB 14.4 02/22/2022     Lab Results   Component Value Date     02/22/2022     Lab Results   Component Value Date    LYMPHSABS 1.90 02/22/2022     No results found for: \"LABLYMP\"  Lab Results   Component Value Date    EOSABS 0.10 02/22/2022       Lab Results   Component Value Date    CREATININE 0.85 09/28/2023     Lab Results   Component Value Date    AST 11 09/28/2023     Lab Results   Component Value Date    ALT 9 09/28/2023     Lab Results   Component Value Date    ALKPHOS 39 09/28/2023     Lab Results   Component Value Date    AST 11 09/28/2023               NEUROIMAGING:  Results for orders placed during the hospital encounter of 10/25/23    MRI brain with and without contrast    Impression  Nonspecific T2/FLAIR signal normalities within the periventricular and subcortical white matter. Differential includes infectious process including Lyme disease, chronic microangiopathic changes, demyelinating disease, sequelae of migraines among other  etiologies.    Apparent intrinsic T1 hyperintensity within the cerebellar folia inferiorly, which may be artifactual (7:5). No definite enhancement.    1 mm foci of hypoenhancement within the midline pituitary gland. Differential includes Rathke's cleft cyst versus microadenoma. Correlate with lab values and clinical history. Follow-up in 6 months is recommended.    Workstation performed: [id number]  No prior neuroimaging done in the system     Results for orders placed during the hospital encounter of 10/25/23    MRI cervical spine with and without " contrast    Impression  Apparent T2 signal abnormality within the cord from C5-C7 on sagittal STIR (4:8) is not seen on axial sequences and may be artifactual. No abnormal enhancement of the cervical cord.    Spondylosis of the cervical spine including mild spinal canal narrowing at C6-C7 and C7-T1. Left subarticular disc protrusion at C6-C7, encroaching/impingng the transiting nerve roots.    No greater than mild to moderate neuroforaminal narrowing as detailed above.        Workstation performed: [id number]        ASSESSMENT AND PLAN:    Mr. Molina is a 59 y.o.male  presenting with follow-up regarding his Lyme disease.  He underwent MRI of the brain and cervical spine both of which came back without any significant findings.  All of his symptoms seem to have been resolved since finishing up the 2 courses of antibiotics and starting the Lexapro. I feel that some of his underlying symptoms could be related with his pituitary abnormalities as well.     Rheumatoid arthritis (HCC)  Previously being managed by Dr. Parks at [facility] but would like to establish with [facility] as Dr. Parks is leaving the practice  - Ambulatory referral to Rheumatology; Future    Pituitary abnormality (HCC)  - Ambulatory Referral to Endocrinology; Future    Lyme borreliosis (resolved)  No neurological findings of Lyme disease on MRI brain.     In regards to the pituitary cyst I would recommend that he should follow-up with the neurosurgery and follow the further lab testing    Anxiety disorder, unspecified type  Already being followed by Emy Gordon at [facility]     Low Vit B 12--recommended to the patient to reach out to his Celso Hope, DO to get IM injections.   Return in about 6 months (around 12/11/2024).     Mr. Molina was encouraged to contact our office with any questions or concerns and to contact the clinic or go to the nearest emergency room if symptoms change or worsen.      I  "have spent a total of 41 min in reviewing and/or ordering tests, medications, or procedures, performing an examination or evaluation, reviewing pertinent history, counseling and educating the patient, referring and/or communicating with other health care professionals, documenting in the EMR and general coordination of care of Mr. Molina  today.           Eleni \"Adithya\" MD Ronaldo.   Staff Neurologist,   Neuroimmunology and Neuroinfectious disease  06/11/24     This report has been created through the use of voice recognition/text compilation software.  Typographical and content errors may occur with this process.  While efforts are made to detect and correct such errors, in some cases errors will persist.  For this reason, wording in this document should be considered in the proper context and not strictly verbatim.  If, when reviewing the document, an error is discovered, please let the office know at [phone]                "

## 2024-06-13 ENCOUNTER — TELEPHONE (OUTPATIENT)
Dept: NEUROLOGY | Facility: CLINIC | Age: 59
End: 2024-06-13

## 2024-06-13 NOTE — TELEPHONE ENCOUNTER
Sorry about that, I did told the patient that he can reach out to his Celso Hope DO regarding the low B 12.       I have made an addendum on my note. He should be getting IM injections (1000 mcg) of B 12 once a week for 4 weeks and then once a month for four more months.     Thank you,     Dr. Ronaldo MD.   06/13/24   2:37 PM

## 2024-06-13 NOTE — TELEPHONE ENCOUNTER
Called and spoke with Zahira from PCP office. Informed her of Dr. Higgins's recommendations for Vitamin B 12 injections. Zahira verbalized understanding and stated she would call patient to set the appts up. No further questions at this time.

## 2024-06-13 NOTE — TELEPHONE ENCOUNTER
Dr Higgins, pt had office visit with you on 6/11/24. Per note, pt's Vit B 12 level was low. Did not see any documentation about getting Vit B 12 Injections. Please advise on the B12 injections so our office can notify pt's PCP with the instructions.  Thank you

## 2024-06-13 NOTE — TELEPHONE ENCOUNTER
Zahira from the office of the Patient PCP calling to get information on the B-12 injections for the patient       Please reach out to them to inform them on how often the patient will need to have these injections so they may schedule appropriately       Contact # [phone] back line at PCP office ask for Zahira       Thank you!

## 2024-06-28 ENCOUNTER — CONSULT (OUTPATIENT)
Dept: ENDOCRINOLOGY | Facility: CLINIC | Age: 59
End: 2024-06-28
Payer: COMMERCIAL

## 2024-06-28 VITALS
TEMPERATURE: 98 F | WEIGHT: 171 LBS | DIASTOLIC BLOOD PRESSURE: 72 MMHG | BODY MASS INDEX: 23.16 KG/M2 | HEART RATE: 55 BPM | HEIGHT: 72 IN | SYSTOLIC BLOOD PRESSURE: 108 MMHG | OXYGEN SATURATION: 98 %

## 2024-06-28 DIAGNOSIS — E05.00 GRAVES DISEASE: ICD-10-CM

## 2024-06-28 DIAGNOSIS — R29.898 MUSCLE RIGIDITY: ICD-10-CM

## 2024-06-28 DIAGNOSIS — R63.4 WEIGHT LOSS: ICD-10-CM

## 2024-06-28 DIAGNOSIS — D35.2 PITUITARY MICROADENOMA (HCC): ICD-10-CM

## 2024-06-28 DIAGNOSIS — E89.0 POSTABLATIVE HYPOTHYROIDISM: Primary | ICD-10-CM

## 2024-06-28 PROCEDURE — 99204 OFFICE O/P NEW MOD 45 MIN: CPT | Performed by: INTERNAL MEDICINE

## 2024-06-28 NOTE — ASSESSMENT & PLAN NOTE
He reports weight loss and some other symptoms suggestive of hyperthyroidism but also reports chills and cold intolerance.    We agreed to continue levothyroxine 125mcg qdaily which seems to be his base dose and wait repeat testing to make a decision.

## 2024-06-28 NOTE — ASSESSMENT & PLAN NOTE
He reports waking up with rigid muscles. May benefit from sleep study. SEGUNDO may be associated with adrenal release that can cause muscle rigidity.  R/o serotoninergic agents that may sometimes cause muscle rigidity and temperature dysregulation.

## 2024-06-28 NOTE — ASSESSMENT & PLAN NOTE
S/p ARMENDARIZ ablation.  Since he is losing weight and reports some sleep disturbance and occasional palpitations, we agreed to check a complete thyroid profile and then make further management decisions.

## 2024-06-28 NOTE — ASSESSMENT & PLAN NOTE
He was found to have 1mm pituitary microadenoma incidentally. This seems non functional but complete lab profile is pending at this time.  Note prior AM cortisol was low normal but will wait repeat testing.  We may continue surveillance with yearly labs and another MRI pituitary in 2 years.

## 2024-06-28 NOTE — PROGRESS NOTES
"    Follow-up Patient Progress Note      CC: weight loss    History of Present Illness:   59 yr male with Hx Graves disease s/p ARMENDARIZ ablation, postablative hypothyroidism, recent weight loss, Hx lymes disease, on and off sleep disturbance -wakes up with muscle rigidity and an incidental 1mm pituitary microadenoma.    He is otherwise functional.    Weight was 175 lbs 9/30/22  197 lbs 3/21/2019  Max adult weight 230 lbs.    Physical Exam:  Body mass index is 23.19 kg/m².  /72 (BP Location: Left arm, Patient Position: Sitting, Cuff Size: Standard)   Pulse 55   Temp 98 °F (36.7 °C) (Tympanic)   Ht 6' (1.829 m)   Wt 77.6 kg (171 lb)   SpO2 98%   BMI 23.19 kg/m²    Vitals:    06/28/24 0900   Weight: 77.6 kg (171 lb)        Physical Exam  Constitutional:       General: He is not in acute distress.     Appearance: He is well-developed.   HENT:      Head: Normocephalic and atraumatic.      Nose: Nose normal.   Eyes:      Conjunctiva/sclera: Conjunctivae normal.   Pulmonary:      Effort: Pulmonary effort is normal.   Abdominal:      General: There is no distension.   Musculoskeletal:      Cervical back: Normal range of motion and neck supple.   Skin:     Findings: No rash.      Comments: No icterus   Neurological:      Mental Status: He is alert and oriented to person, place, and time.         Labs:   No results found for: \"HGBA1C\"    Lab Results   Component Value Date    TSH 2.94 10/11/2023       Lab Results   Component Value Date    CREATININE 0.85 09/28/2023    CREATININE 0.95 08/29/2023    CREATININE 1.01 02/16/2023    BUN 18 09/28/2023    K 4.5 09/28/2023     09/28/2023    CO2 33 (H) 09/28/2023     GFR, Calculated   Date Value Ref Range Status   12/17/2020 80 >60 mL/min/1.73m2 Final     Comment:     mL/min per 1.73 square meters                                            Normal Function or Mild Renal    Disease (if clinically at risk):  >or=60  Moderately Decreased:                30-59  Severely " "Decreased:                  15-29  Renal Failure:                         <15                                            -American GFR: multiply reported GFR by 1.16    Please note that the eGFR is based on the CKD-EPI calculation, and is not intended to be used for drug dosing.     eGFRcr   Date Value Ref Range Status   09/28/2023 100 >59 Final       Lab Results   Component Value Date    ALT 9 09/28/2023    AST 11 09/28/2023    ALKPHOS 39 09/28/2023       No results found for: \"CHOLESTEROL\"  No results found for: \"HDL\"  No results found for: \"TRIG\"  No results found for: \"NONHDLC\"      Assessment/Plan:    1. Postablative hypothyroidism  Assessment & Plan:  He reports weight loss and some other symptoms suggestive of hyperthyroidism but also reports chills and cold intolerance.    We agreed to continue levothyroxine 125mcg qdaily which seems to be his base dose and wait repeat testing to make a decision.  Orders:  -     T4, free; Future  -     TSH, 3rd generation; Future  -     Thyroid stimulating immunoglobulin; Future  -     Thyroid Antibodies Panel; Future  -     Thyroglobulin; Future  2. Pituitary microadenoma (HCC)  Assessment & Plan:  He was found to have 1mm pituitary microadenoma incidentally. This seems non functional but complete lab profile is pending at this time.  Note prior AM cortisol was low normal but will wait repeat testing.  We may continue surveillance with yearly labs and another MRI pituitary in 2 years.  Orders:  -     Ambulatory Referral to Endocrinology  3. Graves disease  Assessment & Plan:  S/p ARMENDARIZ ablation.  Since he is losing weight and reports some sleep disturbance and occasional palpitations, we agreed to check a complete thyroid profile and then make further management decisions.  4. Weight loss  Assessment & Plan:  Weight loss is slowing down and he is currently at ideal BMI.    5. Muscle rigidity  Assessment & Plan:  He reports waking up with rigid muscles. May benefit from " sleep study. SEGUNDO may be associated with adrenal release that can cause muscle rigidity.  R/o serotoninergic agents that may sometimes cause muscle rigidity and temperature dysregulation.        I have spent a total time of 45 minutes on 06/28/24 in caring for this patient including greater than 50% of this time was spent in counseling/coordination of care as listed above.       Discussed with the patient and all questioned fully answered. He will contact me with concerns.    Mik Hampton

## 2024-09-17 PROBLEM — R63.4 WEIGHT LOSS: Status: RESOLVED | Noted: 2024-06-28 | Resolved: 2024-09-17

## 2024-09-18 ENCOUNTER — OFFICE VISIT (OUTPATIENT)
Dept: RHEUMATOLOGY | Facility: CLINIC | Age: 59
End: 2024-09-18

## 2024-09-18 VITALS
BODY MASS INDEX: 24.11 KG/M2 | HEART RATE: 45 BPM | DIASTOLIC BLOOD PRESSURE: 60 MMHG | OXYGEN SATURATION: 100 % | HEIGHT: 72 IN | WEIGHT: 178 LBS | TEMPERATURE: 98 F | SYSTOLIC BLOOD PRESSURE: 140 MMHG

## 2024-09-18 DIAGNOSIS — E53.8 B12 DEFICIENCY: ICD-10-CM

## 2024-09-18 DIAGNOSIS — Z79.52 CURRENT CHRONIC USE OF SYSTEMIC STEROIDS: ICD-10-CM

## 2024-09-18 DIAGNOSIS — Z11.59 NEED FOR HEPATITIS C SCREENING TEST: ICD-10-CM

## 2024-09-18 DIAGNOSIS — M05.9 SEROPOSITIVE RHEUMATOID ARTHRITIS (HCC): Primary | ICD-10-CM

## 2024-09-18 DIAGNOSIS — M15.0 PRIMARY GENERALIZED (OSTEO)ARTHRITIS: ICD-10-CM

## 2024-09-18 DIAGNOSIS — E55.9 VITAMIN D DEFICIENCY: ICD-10-CM

## 2024-09-18 DIAGNOSIS — Z86.39 HISTORY OF GRAVES' DISEASE: ICD-10-CM

## 2024-09-18 DIAGNOSIS — Z79.899 LONG-TERM USE OF PLAQUENIL: ICD-10-CM

## 2024-09-18 DIAGNOSIS — Z86.19 HISTORY OF LYME DISEASE: ICD-10-CM

## 2024-09-18 LAB
25(OH)D3+25(OH)D2 SERPL-MCNC: 46 NG/ML (ref 30–100)
ALBUMIN SERPL-MCNC: 4.5 G/DL (ref 3.5–5.7)
ALP SERPL-CCNC: 43 U/L (ref 35–120)
ALT SERPL-CCNC: 10 U/L
ANION GAP SERPL CALCULATED.3IONS-SCNC: 7 MMOL/L (ref 3–11)
AST SERPL-CCNC: 15 U/L
BASOPHILS # BLD AUTO: 0.1 THOU/CMM (ref 0–0.1)
BASOPHILS NFR BLD AUTO: 1 %
BILIRUB SERPL-MCNC: 0.4 MG/DL (ref 0.2–1)
BUN SERPL-MCNC: 15 MG/DL (ref 7–28)
CALCIUM SERPL-MCNC: 9.6 MG/DL (ref 8.5–10.1)
CHLORIDE SERPL-SCNC: 101 MMOL/L (ref 100–109)
CO2 SERPL-SCNC: 33 MMOL/L (ref 21–31)
CREAT SERPL-MCNC: 0.9 MG/DL (ref 0.53–1.3)
CRP SERPL-MCNC: 1.2 MG/L
CYTOLOGY CMNT CVX/VAG CYTO-IMP: ABNORMAL
DIFFERENTIAL METHOD BLD: NORMAL
EOSINOPHIL # BLD AUTO: 0.3 THOU/CMM (ref 0–0.5)
EOSINOPHIL NFR BLD AUTO: 4 %
ERYTHROCYTE [DISTWIDTH] IN BLOOD BY AUTOMATED COUNT: 13.2 % (ref 12–16)
ERYTHROCYTE [SEDIMENTATION RATE] IN BLOOD BY WESTERGREN METHOD: 2 MM/HR (ref 0–20)
GFR/BSA.PRED SERPLBLD CYS-BASED-ARV: 97 ML/MIN/{1.73_M2}
GLUCOSE SERPL-MCNC: 95 MG/DL (ref 65–99)
HAV AB SER-IMP: ABNORMAL
HAV IGG+IGM SER QL: NONREACTIVE
HAV IGM SERPL QL IA: NONREACTIVE
HBV CORE AB SERPL QL IA: NONREACTIVE
HBV CORE IGM SERPL QL IA: NONREACTIVE
HBV SURFACE AB SERPL IA-ACNC: <3 MIU/ML
HBV SURFACE AG SERPL QL IA: NONREACTIVE
HCT VFR BLD AUTO: 40.2 % (ref 37–48)
HCV AB SERPL QL IA: NONREACTIVE
HGB BLD-MCNC: 14 G/DL (ref 12.5–17)
LYMPHOCYTES # BLD AUTO: 1.5 THOU/CMM (ref 1–3)
LYMPHOCYTES NFR BLD AUTO: 21 %
MCH RBC QN AUTO: 31.9 PG (ref 27–36)
MCHC RBC AUTO-ENTMCNC: 34.7 G/DL (ref 32–37)
MCV RBC AUTO: 92 FL (ref 80–100)
MONOCYTES # BLD AUTO: 0.6 THOU/CMM (ref 0.3–1)
MONOCYTES NFR BLD AUTO: 9 %
NEUTROPHILS # BLD AUTO: 4.7 THOU/CMM (ref 1.8–7.8)
NEUTROPHILS NFR BLD AUTO: 65 %
PLATELET # BLD AUTO: 270 THOU/CMM (ref 140–350)
PMV BLD REES-ECKER: 7.8 FL (ref 7.5–11.3)
POTASSIUM SERPL-SCNC: 4.5 MMOL/L (ref 3.5–5.2)
PROT SERPL-MCNC: 6.6 G/DL (ref 6.3–8.3)
RBC # BLD AUTO: 4.38 MILL/CMM (ref 4–5.4)
REF LAB TEST REF RANGE: ABNORMAL
SODIUM SERPL-SCNC: 141 MMOL/L (ref 135–145)
VIT B12 SERPL-MCNC: 266 PG/ML (ref 180–914)
WBC # BLD AUTO: 7.2 THOU/CMM (ref 4–10.5)

## 2024-09-18 RX ORDER — PREDNISONE 10 MG/1
10 TABLET ORAL AS NEEDED
Qty: 30 TABLET | Refills: 0 | Status: SHIPPED | OUTPATIENT
Start: 2024-09-18

## 2024-09-18 NOTE — PROGRESS NOTES
Ambulatory Visit  Name: Basil Molina      : 1965      MRN: [id number]  Encounter Provider: Mirlande Reyes MD  Encounter Date: 2024   Encounter department: [facility] [place]    Assessment & Plan  Seropositive rheumatoid arthritis (HCC)  Hx of RA with + CCP, is taking  mg BID but lately has had worse morning stiffness and pain in the hands  Plan:  Will give short prednisone taper for possible flare of symptoms  Continue  mg BID  Labs for monitoring inflammatory markers  Baseline xrays of hands and feet  May consider add -on therapy to HCQ in the future if this is not doing enough to manage his symptoms  Orders:    CBC and differential; Future    C-reactive protein; Future    Comprehensive metabolic panel; Future    Sedimentation rate, automated; Future    XR hand 3+ vw right; Future    XR hand 3+ vw left; Future    XR foot 3+ vw right; Future    XR foot 3+ vw left; Future    Ambulatory referral to Rheumatology    predniSONE 10 mg tablet; Take 1 tablet (10 mg total) by mouth if needed (Joint pains)    Long-term use of Plaquenil    Orders:    CBC and differential; Future    Comprehensive metabolic panel; Future    Vitamin D deficiency    Orders:    Vitamin D 25 hydroxy; Future    Need for hepatitis C screening test    Orders:    Chronic Hepatitis Panel; Future      History of Present Illness     Basil Molina is a 59 y.o. male with pmh Lyme disease (was on doxycycline) and presents for f/u of RA with + CCP.    Patient used to follow Dr. Parks. He was taking  mg BID and prednisone 10 mg prn for flare ups (which he ran out of months ago). His last visit was in February and he had no tender or swollen joints at that time.     Currently hands, feet and knees are most bothersome. He also has lateral epicondylitis in the left elbow and he has a brace that he uses but it has not been helping lately. He gets swelling in the left knee that is chronic for years (s/p  replacement in the past). He is planning to see Orthopedic for this. He does not notice swelling in the hands but the past few weeks he has had morning stiffness for about an hour.                   Review of Systems   Constitutional: Negative.    HENT: Negative.     Eyes: Negative.    Respiratory: Negative.     Cardiovascular: Negative.    Gastrointestinal: Negative.    Genitourinary: Negative.    Musculoskeletal:  Positive for arthralgias and joint swelling.           Objective     /60   Pulse (!) 45   Temp 98 °F (36.7 °C)   Ht 6' (1.829 m)   Wt 80.7 kg (178 lb)   SpO2 100%   BMI 24.14 kg/m²     Physical Exam  Constitutional:       Appearance: Normal appearance.   HENT:      Head: Normocephalic.   Eyes:      Extraocular Movements: Extraocular movements intact.      Pupils: Pupils are equal, round, and reactive to light.   Cardiovascular:      Pulses: Normal pulses.      Heart sounds: Normal heart sounds.   Pulmonary:      Effort: Pulmonary effort is normal.      Breath sounds: Normal breath sounds.   Musculoskeletal:         General: Swelling (swelling of the left knee chronic from post-surgical changes, not erythematous or warm) present.      Comments: MSK Exam  The following areas have been examined today and do not show any signs of synovitis, tenderness, or restricted ROM unless otherwise specified below:  Shoulders  Elbows  Wrists  Hands  Hips  Knees  Ankles  Feet    Skin:     General: Skin is warm and dry.   Neurological:      Mental Status: He is alert and oriented to person, place, and time.

## 2024-11-12 ENCOUNTER — CONSULT (OUTPATIENT)
Dept: PAIN MEDICINE | Facility: CLINIC | Age: 59
End: 2024-11-12
Payer: COMMERCIAL

## 2024-11-12 ENCOUNTER — TELEPHONE (OUTPATIENT)
Age: 59
End: 2024-11-12

## 2024-11-12 VITALS — WEIGHT: 178 LBS | HEIGHT: 72 IN | BODY MASS INDEX: 24.11 KG/M2

## 2024-11-12 DIAGNOSIS — M54.12 CERVICAL RADICULOPATHY: Primary | ICD-10-CM

## 2024-11-12 DIAGNOSIS — M50.20 DISPLACEMENT OF CERVICAL INTERVERTEBRAL DISC: ICD-10-CM

## 2024-11-12 PROCEDURE — 99204 OFFICE O/P NEW MOD 45 MIN: CPT | Performed by: ANESTHESIOLOGY

## 2024-11-12 NOTE — PROGRESS NOTES
Assessment  1. Cervical radiculopathy    2. Displacement of cervical intervertebral disc      Patient presenting with ongoing neck and left arm pain for 2-3 months.  Pain is consistent with cervical radicular pain accompanied by pain 8/10 on the pain scale with inability to participate in IADLs for >6 weeks. Patient has tried diclofenac with no benefit, gabapentin with modest benefit. Denies any bowel or bladder incontinence, saddle anesthesia.    Independently reviewed and interpreted MRI cervical spine - this showed a left sided disc herniation at C6-7 with severe left foraminal narrowing.    Plan:    Given ongoing severe left radicular LUE symptoms with MRI findings of severe left foraminal narrowing, discussed and offered left C7-T1 ITZEL.  The procedures, its risks, and benefits were explained in detail to the patient. Risks include but are not limited to bleeding, infection, hematoma formation, abscess formation, weakness, headache, failure the pain to improve, nerve irritation or damage, and potential worsening of the pain.  The approach was demonstrated using models and literature was provided. The patient verbalized understanding and wished to proceed with the procedure.     Follow-up 4 weeks after ITZEL or sooner as warranted.    Reviewed external notes from Orthopedics, PCP offices for review and interpretation of recent and prior relevant medical histories, treatment recommendations, medication and/or interventional treatment responses.    Reviewed hemoglobin A1c, renal function, CBC and/or PT/INR prior to discussing/offering interventional modalities.    [place] Prescription Drug Monitoring Program report was reviewed and was appropriate     My impressions and treatment recommendations were discussed in detail with the patient who verbalized understanding and had no further questions.  Discharge instructions were provided. I personally saw and examined the patient and I agree with the above discussed  plan of care.    Orders Placed This Encounter   Procedures    FL spine and pain procedure     Standing Status:   Future     Standing Expiration Date:   11/12/2028     Order Specific Question:   Reason for Exam:     Answer:   left C7-T1 ITZEL     Order Specific Question:   Anticoagulant hold needed?     Answer:   no     No orders of the defined types were placed in this encounter.      History of Present Illness    Basil Molina is a 59 y.o. male presenting for consultation at [facility] for exam and evaluation of ongoing neck and left radiating arm pain for greater than 1 year, worsening over the past several months. Pain started without any precipitating injury or trauma. Over the past month, the intensity of pain has been Moderate to severe. Pain is currently 8/10 on the pain scale. Pain does interfere with age appropriate activities of daily living. Pain is nearly constant, worse at night. Pain is described as cramping, sharp with numbness and pins/needles. Patient denies weakness in the upper extremities. Assistance device used: None.    Worsening factors noted: lying down, standing, walking, exercise.   Improving factors noted: walking.    Treatments tried:   PT: no  Chiropractic therapy: no  Injections: no   Previous spine surgery: No    Anticoagulation: no    Medications tried:   Diclofenac, gabapentin    I have personally reviewed and/or updated the patient's past medical history, past surgical history, family history, social history, current medications, allergies, and vital signs today.     Review of Systems   Constitutional:  Negative for chills and fever.   HENT:  Negative for ear pain and sore throat.    Eyes:  Negative for pain and visual disturbance.   Respiratory:  Negative for cough and shortness of breath.    Cardiovascular:  Negative for chest pain and palpitations.   Gastrointestinal:  Negative for abdominal pain and vomiting.   Genitourinary:  Negative for dysuria and  hematuria.   Musculoskeletal:  Positive for arthralgias, myalgias, neck pain and neck stiffness. Negative for back pain.   Skin:  Negative for color change and rash.   Neurological:  Positive for weakness and numbness (fingers). Negative for seizures and syncope.   All other systems reviewed and are negative.      Patient Active Problem List   Diagnosis    Postablative hypothyroidism    Graves disease    Pituitary microadenoma (HCC)    Muscle rigidity       Past Medical History:   Diagnosis Date    COPD (chronic obstructive pulmonary disease) (HCC)     Disease of thyroid gland     graves disease    Rheumatoid arthritis (HCC)        Past Surgical History:   Procedure Laterality Date    FOOT SURGERY Left     KNEE SURGERY      SHOULDER SURGERY         Family History   Problem Relation Age of Onset    Parkinsonism Mother     Diverticulitis Father        Social History     Occupational History    Not on file   Tobacco Use    Smoking status: Former    Smokeless tobacco: Never   Vaping Use    Vaping status: Never Used   Substance and Sexual Activity    Alcohol use: Yes     Comment: Occasionally    Drug use: Not Currently    Sexual activity: Not on file       Current Outpatient Medications on File Prior to Visit   Medication Sig    albuterol (PROVENTIL HFA,VENTOLIN HFA) 90 mcg/act inhaler Inhale 2 puffs every 6 (six) hours as needed for wheezing    budesonide-formoterol (Symbicort) 160-4.5 mcg/act inhaler Inhale 2 puffs in the morning    fluticasone (FLONASE) 50 mcg/act nasal spray 1 spray into each nostril daily    hydroxychloroquine (PLAQUENIL) 200 mg tablet Take 200 mg by mouth 2 (two) times a day    montelukast (SINGULAIR) 10 mg tablet Take 10 mg by mouth daily at bedtime as needed    Omeprazole (PRILOSEC PO) Take 20 mg by mouth daily    predniSONE 10 mg tablet Take 1 tablet (10 mg total) by mouth if needed (Joint pains)    ARIPiprazole (ABILIFY) 2 mg tablet Take 2 mg by mouth every morning (Patient not taking: Reported  on 6/28/2024)    doxycycline hyclate (VIBRA-TABS) 100 mg tablet Take 100 mg by mouth 2 (two) times a day (Patient not taking: Reported on 11/16/2023)    escitalopram (LEXAPRO) 10 mg tablet Take 10 mg by mouth daily (Patient not taking: Reported on 9/18/2024)    levocetirizine (XYZAL) 5 MG tablet Take 5 mg by mouth daily at bedtime as needed (Patient not taking: Reported on 5/23/2024)    levothyroxine 125 mcg tablet Take 125 mcg by mouth daily    meloxicam (MOBIC) 15 mg tablet Take 15 mg by mouth if needed (Patient not taking: Reported on 11/16/2023)    ondansetron (Zofran ODT) 4 mg disintegrating tablet Take 1 tablet (4 mg total) by mouth every 8 (eight) hours as needed for nausea for up to 15 doses (Patient not taking: Reported on 10/9/2023)    oxyCODONE-acetaminophen (PERCOCET) 5-325 mg per tablet Take 1 tablet by mouth every 4 (four) hours as needed for moderate pain Max Daily Amount: 6 tablets (Patient not taking: Reported on 10/9/2023)     No current facility-administered medications on file prior to visit.       Allergies   Allergen Reactions    Aminophylline Other (See Comments)    Pollen Extract Other (See Comments)    Theophyllines        Physical Exam    Ht 6' (1.829 m)   Wt 80.7 kg (178 lb)   BMI 24.14 kg/m²     Constitutional: normal, well developed, well nourished, alert, in no distress and non-toxic and no overt pain behavior.  Eyes: anicteric  HEENT: grossly intact  Neck: supple, symmetric, trachea midline and no masses   Pulmonary:even and unlabored  Cardiovascular:No edema or pitting edema present  Skin:Normal without rashes or lesions and well hydrated  Psychiatric:Mood and affect appropriate  Neurologic: Motor function is grossly intact with no focal neurologic deficits  Musculoskeletal: positive left Spurlings     Imaging  MRI cervical spine:  Clinical History: Neck pain, chronic numbness and tingling down the arm.     Procedure: MRI of the cervical spine was performed the following sequences:    Sagittal T1, T2, and STIR images. Axial T2 weighted images were also obtained.   Multiple sequences and images are degraded by motion. The patient could not   tolerate the scan further and axial gradient echo images could not be obtained.     Comparison: None.     Findings:     Alignment: The cervical vertebrae are normal in alignment.     Marrow: There is no abnormal marrow signal on the STIR images.     Cord: The spinal cord is normal in volume. There is no definite cord signal   abnormality is seen on this motion degraded study.      C2-C3: There is no significant spinal stenosis or foraminal narrowing     C3-C4: There is a disc bulge and uncinate ridging causing mild bilateral   foraminal narrowing.     C4-C5: There is a disc bulge and uncinate ridging causing mild right foraminal   narrowing.     C5-C6: There is a disc bulge causing mild spinal stenosis without foraminal   narrowing.     C6-C7: There is a disc bulge with a left subarticular and foraminal disc   herniation causing moderate to severe narrowing of the proximal left neural   foramen. This abuts the left C7 nerve root, correlate clinically for left C7   distribution symptoms. There is mild spinal stenosis.     C7-T1: There is a disc bulge with a small central disc herniation causing mild   spinal stenosis without foraminal narrowing.

## 2024-11-14 ENCOUNTER — HOSPITAL ENCOUNTER (OUTPATIENT)
Dept: RADIOLOGY | Facility: MEDICAL CENTER | Age: 59
Discharge: HOME | End: 2024-11-14
Payer: COMMERCIAL

## 2024-11-14 VITALS
RESPIRATION RATE: 18 BRPM | TEMPERATURE: 98.3 F | HEART RATE: 61 BPM | DIASTOLIC BLOOD PRESSURE: 82 MMHG | OXYGEN SATURATION: 95 % | SYSTOLIC BLOOD PRESSURE: 153 MMHG

## 2024-11-14 DIAGNOSIS — M54.12 CERVICAL RADICULOPATHY: ICD-10-CM

## 2024-11-14 PROCEDURE — 62321 NJX INTERLAMINAR CRV/THRC: CPT | Performed by: ANESTHESIOLOGY

## 2024-11-14 RX ORDER — BUPIVACAINE HCL/PF 2.5 MG/ML
1 VIAL (ML) INJECTION ONCE
Status: COMPLETED | OUTPATIENT
Start: 2024-11-14 | End: 2024-11-14

## 2024-11-14 RX ORDER — METHYLPREDNISOLONE ACETATE 80 MG/ML
80 INJECTION, SUSPENSION INTRA-ARTICULAR; INTRALESIONAL; INTRAMUSCULAR; PARENTERAL; SOFT TISSUE ONCE
Status: COMPLETED | OUTPATIENT
Start: 2024-11-14 | End: 2024-11-14

## 2024-11-14 RX ADMIN — IOHEXOL 1 ML: 300 INJECTION, SOLUTION INTRAVENOUS at 09:42

## 2024-11-14 RX ADMIN — BUPIVACAINE HYDROCHLORIDE 1 ML: 2.5 INJECTION, SOLUTION EPIDURAL; INFILTRATION; INTRACAUDAL at 09:43

## 2024-11-14 RX ADMIN — METHYLPREDNISOLONE ACETATE 80 MG: 80 INJECTION, SUSPENSION INTRA-ARTICULAR; INTRALESIONAL; INTRAMUSCULAR; SOFT TISSUE at 09:43

## 2024-11-14 NOTE — DISCHARGE INSTRUCTIONS
Epidural Steroid Injection   WHAT YOU NEED TO KNOW:   An epidural steroid injection (FRANCESCA) is a procedure to inject steroid medicine into the epidural space. The epidural space is between your spinal cord and vertebrae. Steroids reduce inflammation and fluid buildup in your spine that may be causing pain. You may be given pain medicine along with the steroids.          ACTIVITY  Do not drive or operate machinery today.  No strenuous activity today - bending, lifting, etc.  You may resume normal activites starting tomorrow - start slowly and as tolerated.  You may shower today, but no tub baths or hot tubs.  You may have numbness for several hours from the local anesthetic. Please use caution and common sense, especially with weight-bearing activities.    CARE OF THE INJECTION SITE  If you have soreness or pain, apply ice to the area today (20 minutes on/20 minutes off).  Starting tomorrow, you may use warm, moist heat or ice if needed.  You may have an increase or change in your discomfort for 36-48 hours after your treatment.  Apply ice and continue with any pain medication you have been prescribed.  Notify the [facility] if you have any of the following: redness, drainage, swelling, headache, stiff neck or fever above 100°F.    SPECIAL INSTRUCTIONS  Our office will contact you in approximately 14 days for a progress report.    MEDICATIONS  Continue to take all routine medications.  Our office may have instructed you to hold some medications.You may resume your Diclofenac in 24 hours so tomorrow after 10:15 am     As no general anesthesia was used in today's procedure, you should not experience any side effects related to anesthesia.     If you are diabetic, the steroids used in today's injection may temporarily increase your blood sugar levels after the first few days after your injection. Please keep a close eye on your sugars and alert the doctor who manages your diabetes if your sugars are  significantly high from your baseline or you are symptomatic.     If you have a problem specifically related to your procedure, please call our office at [phone].  Problems not related to your procedure should be directed to your primary care physician.

## 2024-11-14 NOTE — H&P
History of Present Illness: The patient is a 59 y.o. male who presents with complaints of neck and arm pain    Past Medical History:   Diagnosis Date    COPD (chronic obstructive pulmonary disease) (HCC)     Disease of thyroid gland     graves disease    Rheumatoid arthritis (HCC)        Past Surgical History:   Procedure Laterality Date    FOOT SURGERY Left     KNEE SURGERY      SHOULDER SURGERY           Current Outpatient Medications:     albuterol (PROVENTIL HFA,VENTOLIN HFA) 90 mcg/act inhaler, Inhale 2 puffs every 6 (six) hours as needed for wheezing, Disp: , Rfl:     ARIPiprazole (ABILIFY) 2 mg tablet, Take 2 mg by mouth every morning (Patient not taking: Reported on 6/28/2024), Disp: , Rfl:     budesonide-formoterol (Symbicort) 160-4.5 mcg/act inhaler, Inhale 2 puffs in the morning, Disp: , Rfl:     doxycycline hyclate (VIBRA-TABS) 100 mg tablet, Take 100 mg by mouth 2 (two) times a day (Patient not taking: Reported on 11/16/2023), Disp: , Rfl:     escitalopram (LEXAPRO) 10 mg tablet, Take 10 mg by mouth daily (Patient not taking: Reported on 9/18/2024), Disp: , Rfl:     fluticasone (FLONASE) 50 mcg/act nasal spray, 1 spray into each nostril daily, Disp: , Rfl:     hydroxychloroquine (PLAQUENIL) 200 mg tablet, Take 200 mg by mouth 2 (two) times a day, Disp: , Rfl:     levocetirizine (XYZAL) 5 MG tablet, Take 5 mg by mouth daily at bedtime as needed (Patient not taking: Reported on 5/23/2024), Disp: , Rfl:     levothyroxine 125 mcg tablet, Take 125 mcg by mouth daily, Disp: , Rfl:     meloxicam (MOBIC) 15 mg tablet, Take 15 mg by mouth if needed (Patient not taking: Reported on 11/16/2023), Disp: , Rfl:     montelukast (SINGULAIR) 10 mg tablet, Take 10 mg by mouth daily at bedtime as needed, Disp: , Rfl:     Omeprazole (PRILOSEC PO), Take 20 mg by mouth daily, Disp: , Rfl:     ondansetron (Zofran ODT) 4 mg disintegrating tablet, Take 1 tablet (4 mg total) by mouth every 8 (eight) hours as needed for nausea  for up to 15 doses (Patient not taking: Reported on 10/9/2023), Disp: 15 tablet, Rfl: 0    oxyCODONE-acetaminophen (PERCOCET) 5-325 mg per tablet, Take 1 tablet by mouth every 4 (four) hours as needed for moderate pain Max Daily Amount: 6 tablets (Patient not taking: Reported on 10/9/2023), Disp: 20 tablet, Rfl: 0    predniSONE 10 mg tablet, Take 1 tablet (10 mg total) by mouth if needed (Joint pains), Disp: 30 tablet, Rfl: 0  No current facility-administered medications for this encounter.    Allergies   Allergen Reactions    Aminophylline Other (See Comments)    Pollen Extract Other (See Comments)    Theophyllines        Physical Exam:   Vitals:    11/14/24 0931   BP: 146/82   Pulse: 60   Temp: 98.3 °F (36.8 °C)   SpO2: 97%     General: Awake, Alert, Oriented x 3, Mood and affect appropriate  Respiratory: Respirations even and unlabored  Cardiovascular: Peripheral pulses intact; no edema  Musculoskeletal Exam: neck and arm pain    ASA Score: 2    Patient/Chart Verification  Patient ID Verified: Verbal  ID Band Applied: No  Consents Confirmed: Procedural, To be obtained in the Pre-Procedure area  Interval H&P(within 24 hr) Complete (required for Outpatients and Surgery Admit only): To be obtained in the Procedural area  Allergies Reviewed: Yes  Anticoag/NSAID held?: No (Pt took Diclofenac yesterday. WS made aware.)  Currently on antibiotics?: No    Assessment:   1. Cervical radiculopathy        Plan: left C7-T1 ITZEL

## 2024-11-25 ENCOUNTER — OFFICE VISIT (OUTPATIENT)
Dept: OBGYN CLINIC | Facility: HOSPITAL | Age: 59
End: 2024-11-25
Payer: COMMERCIAL

## 2024-11-25 ENCOUNTER — HOSPITAL ENCOUNTER (OUTPATIENT)
Dept: RADIOLOGY | Facility: HOSPITAL | Age: 59
Discharge: HOME | End: 2024-11-25
Attending: ORTHOPAEDIC SURGERY
Payer: COMMERCIAL

## 2024-11-25 VITALS
BODY MASS INDEX: 24.1 KG/M2 | WEIGHT: 177.91 LBS | HEART RATE: 76 BPM | DIASTOLIC BLOOD PRESSURE: 74 MMHG | SYSTOLIC BLOOD PRESSURE: 182 MMHG | HEIGHT: 72 IN

## 2024-11-25 DIAGNOSIS — M54.12 LEFT CERVICAL RADICULOPATHY: ICD-10-CM

## 2024-11-25 DIAGNOSIS — R52 PAIN: Primary | ICD-10-CM

## 2024-11-25 DIAGNOSIS — R52 PAIN: ICD-10-CM

## 2024-11-25 PROCEDURE — 99204 OFFICE O/P NEW MOD 45 MIN: CPT | Performed by: ORTHOPAEDIC SURGERY

## 2024-11-25 PROCEDURE — 72050 X-RAY EXAM NECK SPINE 4/5VWS: CPT

## 2024-11-25 NOTE — LETTER
November 28, 2024     Patient: Basil Molina  YOB: 1965  Date of Visit: 11/25/2024      To Whom it May Concern:    Basil Molina is under my professional care. Basil was seen in my office on 11/25/2024. The patient should remain out of work until follow up in 4 weeks.      If you have any questions or concerns, please don't hesitate to call.         Sincerely,          Carroll Silva MD        CC: No Recipients

## 2024-11-25 NOTE — PROGRESS NOTES
Assessment & Plan/Medical Decision Makin y.o. male with Left Periscapular Pain and Left Arm Pain and imaging findings most notable for Left C6-C7 herniated disc          The clinical, physical and imaging findings were reviewed with the patient.  Basil  has a constellation of findings consistent with Cervical Radiculopathyin the setting of cervical degenerative disease and left C6-C7 herniated disc.   Physical exam demonstrating decreased sensation in the left forearm along with left elbow extension weakness.  We discussed the treatment options including physical therapy, at home exercises, activity modifications, chiropractic medicine, oral medications, interventional spine procedures and surgical intervention.  Given that the patient has failed multiple nonoperative treatment modalities and has significant weakness in his left upper extremity along with numbness, inability to work, we did discuss the role of surgical intervention.  We discussed ACDF procedure to address his ventral pathology.  At this time Donnell is interested in pursuing a posterior based procedure which is not recommended at this time given the degree of ventral pathology.  We did offer him a second opinion with our neurosurgery colleagues.  Donnell expressed interest in pursuing continued nonoperative treatments.  We provided him a referral to physical therapy as well as gabapentin to help with neuropathic pain.  He unfortunately has been unable to work due to his pain, numbness and weakness.  A work note was provided.    Patient instructed to return to office/ER sooner if symptoms are not improving, getting worse, or new worrisome/neurologic symptoms arise.  Patient will follow up in approx. 4 weeks for re-evaluation after further conservative treatments.       Subjective:      Chief Complaint: Neck Pain    HPI:  Basil Molina is a 59 y.o. male presenting for initial visit with chief complaint of left arm pain.  Pain began  5/2024.   He did have injury in which his dog yanked his left arm with immediate shoulder pain.  He notes history of long lymes disease.   He notes concerns over missing work and returning to work.    Today he complains of left forearm pain and numbness to dorsal hand.  He denies significant neck pain.   He rates his arm pain at 8/10 and greater at times.  He describes the arm sensation as pain/ache and severe pain.  Most activity aggravates while rest alleviates.  His pain effects his sleep.    He briefly tried gabapentin in the past.   He has used Mobic and oral steroid with short-lived benefit.  He has done limited physical therapy with limited benefit.  He has had cervical C7-T1 ILESI with no benefit.   He denies recent changes to bowel or bladder.      Denies any jayde trauma. Denies fever or chills, no night sweats. Denies any bladder or bowel changes.      Conservative therapy includes the following:   Medications: Gabapentin    Injections: C7-T1 ILESI   11/14/2024, no benefit  Physical Therapy: Yes, recent  Chiropractic Medicine: has not attempted  Accupunture/Massage Therapy: has not attempted   These therapeutic modalities were ineffective at providing sustained pain relief/functional improvement.     Nicotine dependent: Denies  Occupation:    Living situation: Lives with family   ADLs: patient is able to perform     Objective:     Family History   Problem Relation Age of Onset    Parkinsonism Mother     Diverticulitis Father        Past Medical History:   Diagnosis Date    COPD (chronic obstructive pulmonary disease) ([facility])     Disease of thyroid gland     graves disease    Rheumatoid arthritis ([facility])        Current Outpatient Medications   Medication Sig Dispense Refill    albuterol (PROVENTIL HFA,VENTOLIN HFA) 90 mcg/act inhaler Inhale 2 puffs every 6 (six) hours as needed for wheezing      budesonide-formoterol (Symbicort) 160-4.5 mcg/act inhaler Inhale 2 puffs in the morning      fluticasone  (FLONASE) 50 mcg/act nasal spray 1 spray into each nostril daily      hydroxychloroquine (PLAQUENIL) 200 mg tablet Take 200 mg by mouth 2 (two) times a day      levothyroxine 125 mcg tablet Take 125 mcg by mouth daily      montelukast (SINGULAIR) 10 mg tablet Take 10 mg by mouth daily at bedtime as needed      Omeprazole (PRILOSEC PO) Take 20 mg by mouth daily      predniSONE 10 mg tablet Take 1 tablet (10 mg total) by mouth if needed (Joint pains) 30 tablet 0    ARIPiprazole (ABILIFY) 2 mg tablet Take 2 mg by mouth every morning (Patient not taking: Reported on 6/28/2024)      doxycycline hyclate (VIBRA-TABS) 100 mg tablet Take 100 mg by mouth 2 (two) times a day (Patient not taking: Reported on 11/16/2023)      escitalopram (LEXAPRO) 10 mg tablet Take 10 mg by mouth daily (Patient not taking: Reported on 9/18/2024)      levocetirizine (XYZAL) 5 MG tablet Take 5 mg by mouth daily at bedtime as needed (Patient not taking: Reported on 5/23/2024)      meloxicam (MOBIC) 15 mg tablet Take 15 mg by mouth if needed (Patient not taking: Reported on 11/16/2023)      ondansetron (Zofran ODT) 4 mg disintegrating tablet Take 1 tablet (4 mg total) by mouth every 8 (eight) hours as needed for nausea for up to 15 doses (Patient not taking: Reported on 10/9/2023) 15 tablet 0    oxyCODONE-acetaminophen (PERCOCET) 5-325 mg per tablet Take 1 tablet by mouth every 4 (four) hours as needed for moderate pain Max Daily Amount: 6 tablets (Patient not taking: Reported on 10/9/2023) 20 tablet 0     No current facility-administered medications for this visit.       Past Surgical History:   Procedure Laterality Date    FOOT SURGERY Left     KNEE SURGERY      SHOULDER SURGERY         Social History     Socioeconomic History    Marital status: /Civil Union     Spouse name: Not on file    Number of children: Not on file    Years of education: Not on file    Highest education level: Not on file   Occupational History    Not on file    Tobacco Use    Smoking status: Former    Smokeless tobacco: Never   Vaping Use    Vaping status: Never Used   Substance and Sexual Activity    Alcohol use: Yes     Comment: Occasionally    Drug use: Not Currently    Sexual activity: Not on file   Other Topics Concern    Not on file   Social History Narrative    Not on file     Social Drivers of Health     Financial Resource Strain: Patient Declined (6/6/2024)    Received from [facility]    Overall Financial Resource Strain (CARDIA)     Difficulty of Paying Living Expenses: Patient declined   Food Insecurity: Patient Declined (6/6/2024)    Received from [facility]    Hunger Vital Sign     Worried About Running Out of Food in the Last Year: Patient declined     Ran Out of Food in the Last Year: Patient declined   Transportation Needs: Patient Declined (6/6/2024)    Received from [facility]    PRAPARE - Transportation     Lack of Transportation (Medical): Patient declined     Lack of Transportation (Non-Medical): Patient declined   Physical Activity: Not on file   Stress: No Stress Concern Present (6/6/2024)    Received from [facility]    [employer] [place] [employer] - Occupational Stress Questionnaire     Feeling of Stress : Only a little   Social Connections: Feeling Socially Integrated (6/6/2024)    Received from [facility]    OASIS : Social Isolation     How often do you feel lonely or isolated from those around you?: Rarely   Intimate Partner Violence: Not At Risk (6/6/2024)    Received from [facility]    Humiliation, Afraid, Rape, and Kick questionnaire     Fear of Current or Ex-Partner: No     Emotionally Abused: No     Physically Abused: No     Sexually Abused: No   Housing Stability: Patient Declined (6/6/2024)    Received from [facility]    Housing Stability Vital Sign     Unable to Pay for Housing in the Last Year:  Patient declined     Number of Times Moved in the Last Year: Not on file     Homeless in the Last Year: Patient declined       Allergies   Allergen Reactions    Aminophylline Other (See Comments)    Pollen Extract Other (See Comments)    Theophyllines        Review of Systems  General- denies fever/chills  HEENT- denies hearing loss or sore throat  Eyes- denies eye pain or visual disturbances, denies red eyes  Respiratory- denies cough or SOB  Cardio- denies chest pain or palpitations  GI- denies abdominal pain  Endocrine- denies urinary frequency  Urinary- denies pain with urination  Musculoskeletal- Negative except noted above  Skin- denies rashes or wounds  Neurological- denies dizziness or headache  Psychiatric- denies anxiety or difficulty concentrating    Physical Exam  BP (!) 182/74   Pulse 76   Ht 6' (1.829 m)   Wt 80.7 kg (177 lb 14.6 oz)   BMI 24.13 kg/m²     General/Constitutional: No apparent distress: well-nourished and well developed.  Lymphatic: No appreciable lymphadenopathy  Respiratory: Non-labored breathing  Vascular: No edema, swelling or tenderness, except as noted in detailed exam.  Integumentary: No impressive skin lesions present, except as noted in detailed exam.  Psych: Normal mood and affect, oriented to person, place and time.  MSK: normal other than stated in HPI and exam  Gait & balance: no evidence of myelopathic gait, ambulates Independently     Cervical  spine range of motion:  -Forward flexion chin to chest  -Extension to 60  -Lateral bend 30 right, 30 left  -Rotation 45 right, 45 left.    There is no point tenderness with palpation along the posterior cervical, thoracic, lumbar spine.     Neurologic:  Upper Extremity Motor Function    Right  Left    Deltoid  5/5  5/5    Bicep  5/5  5/5    Wrist extension  5/5  4+/5    Tricep  5/5  4-/5    Finger flexion/  5/5  5/5    Hand intrinsic  5/5  5/5      Lower Extremity Motor Function    Right  Left    Iliopsoas  5/5  5/5   "  Quadriceps 5/5 5/5   Tibialis anterior  5/5 5/5    EHL  5/5 5/5    Gastroc. muscle  5/5 5/5      Sensory: light touch is intact to bilateral upper and lower extremities     Reflexes:    Right Left   Biceps 1+ 1+   Triceps 1+ 0   Brachioradialis 1+ 1+   Patellar 1+ 1+   Achilles 1+ 1+   Babinski neg neg     Other tests:  Spurling's: negative  Andres's: negative  Clonus: negative  Inverted Radial: negative   Tandem gait: negative  Carpal Tinel/Phalen: negative bilateral   Cubital Tinel: negative bilateral   Shoulder: painless active & passive ROM bilateral     Diagnostic Tests   IMAGING: I have personally reviewed the images and these are my findings:  Cervical Spine X-rays from 11/25/2024: multi level cervical spondylosis with loss of disc height at C6-C7, osteophyte formation and uncovertebral hypertrophy, no apparent spondylolisthesis, no appreciated lytic/blastic lesions, no obvious instability    Cervical Spine MRI from 10/30/2024: multi level cervical disc degeneration with disc desiccation, loss of disc height, left foraminal herniation at C6-C7 abuting left C7 nerve root.      Electronic Medical Records were reviewed including past office notes and imaging.      Procedures, if performed today     None performed       Portions of the record may have been created with voice recognition software.  Occasional wrong word or \"sound a like\" substitutions may have occurred due to the inherent limitations of voice recognition software.  Read the chart carefully and recognize, using context, where substitutions have occurred.    "

## 2024-11-26 ENCOUNTER — EVALUATION (OUTPATIENT)
Dept: PHYSICAL THERAPY | Facility: CLINIC | Age: 59
End: 2024-11-26
Payer: COMMERCIAL

## 2024-11-26 ENCOUNTER — TELEPHONE (OUTPATIENT)
Age: 59
End: 2024-11-26

## 2024-11-26 DIAGNOSIS — M54.12 LEFT CERVICAL RADICULOPATHY: ICD-10-CM

## 2024-11-26 PROCEDURE — 97162 PT EVAL MOD COMPLEX 30 MIN: CPT

## 2024-11-26 NOTE — TELEPHONE ENCOUNTER
Pt called after visiting [facility] for neck pain and is seeking a second opinion on a surgery , he wants the fastest response he can get so he requested I send to practice Director,  But Dr Pearl you have seen this pt previous for pit issues ,   Can we bring him back for an appointment for cervical issues resulting in numbness and pain in his hands,[facility] a [facility]inched [facility].  Please advise  Thanks

## 2024-11-26 NOTE — PROGRESS NOTES
PT Evaluation     Today's date: 2024  Patient name: Basil Molina  : 1965  MRN: [id number]  Referring provider: Carroll Silva MD  Dx:   Encounter Diagnosis     ICD-10-CM    1. Left cervical radiculopathy  M54.12 Ambulatory referral to Physical Therapy                     Assessment  Impairments: abnormal or restricted ROM, activity intolerance, impaired physical strength, lacks appropriate home exercise program and pain with function    Assessment details: Basil Molina is a 59 y.o. male who presents with LUE pain, LUE paraesthesias, decreased LUE strength, decreased L shoulder ROM, and Impaired LUE sensation. Due to these impairments, patient has difficulty performing ADL's, work-related activities, stair negotiation, lifting/carrying, reaching. Noted brief decrease in symptoms with L side bending during mechanical assessment. Given as part of HEP to further assess with edu on centralization vs peripheralization. Patient's clinical presentation is consistent with their referring diagnosis of Left cervical radiculopathy. Patient has been educated in home exercise program and plan of care. Patient would benefit from skilled physical therapy services to address their aforementioned functional limitations and progress towards prior level of function and independence with home exercise program.       Goals  Short Term Goals to be accomplished in 4 weeks:  STG1: Pt will be I with HEP to maximize progress between therapy sessions  STG2: Pt will be I with posture management to limit impingement.   STG3: Pt will demo 10 deg improvement in L shoulder ROM needed for reaching and work related tasks  STG4: Pt will demo 1/2 MMT grade inc in cervical stabilizers and shoulder strength to improve lifting/carrying  STG5: Pt will deny symptom radiation beyond shoulder at <50% intensity        Long Term Goals to be accomplished in 12 weeks:   LTG1: Pt will demo cervical stab/shoulder strength to WNL as per PLOF  to improve lifting/carrying  LTG2: Pt will return to work as per PLOF pain free  LTG3: Pt will demo equal  strength on R and L hands per PLOF for improved function  LTG4: Pt will deny sleep disturbance due to pain      Plan  Patient would benefit from: PT eval and skilled physical therapy  Planned modality interventions: cryotherapy, thermotherapy: hydrocollator packs, traction and unattended electrical stimulation    Planned therapy interventions: manual therapy, neuromuscular re-education, self care, therapeutic activities, therapeutic exercise, home exercise program and patient/caregiver education    Frequency: 2x week  Plan of Care beginning date: 11/26/2024  Plan of Care expiration date: 2/18/2025  Treatment plan discussed with: patient  Plan details: HEP development, stretching, strengthening, A/AA/PROM, joint mobilizations, posture education, STM/MI as needed to reduce muscle tension, muscle reeducation, PLOC discussed and agreed upon with patient.            Subjective Evaluation    History of Present Illness  Mechanism of injury: Patient reports to PT with concerns with paraesthesias and pain into LUE. Patient denies neck pain at this time. Patient stated MD recommended cervical surgery (using hardware). Patient has appt for second opinion to see if a less invasive option is possible. Patient had injection 12 days ago with no improvement in symptoms. Patient reports gabapentin used to help but no longer does. Patient reports cervical motion, STS And stair negotiation seem to also flare symptoms. Patient reports at night it can vary in pain levels. Usually he has trouble sleeping due to trouble getting comfortable. Patient has fairly consistent numbness into pointer and middle fingers of LUE. Patient has intermittent numbness and pain into ring finger and pinky as well as tricep on LUE. Patient has been unable to work due to physical nature of job and decreased strength into LUE. Patient reports he has  seen a chiropractor who cracked his neck but he did not like the feeling.   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    Diagnostic Tests  MRI studies: abnormal  Treatments  Previous treatment: chiropractic        Objective        Cervicial AROM limitation (* = pain)      Flexion: WNL  Extension: WNL(mild soreness)  R rotation: Min jordan  L rotation: Min jordan  R sidebend: Mod jordan  L sidebend: Mod jordan   Protraction: Mod jordan  Retraction: Mod jordan     Thoracic AROM limitations (* = pain)  Thoracic ext: TBA  Thoracic rot R TBA  Thoracic rot L TBA    Shoulder AROM: standing      R  L  Flexion :  180  160  Abduction:  TBA  TBA      Strength: MMT R  L  Shoulder flexion: 5/5  4+/5    Shoulder abduction: 5/5  4/5    Shoulder ER:  5/5  4+/5  Shoulder IR:  5/5  4/5  Elbow flex:  5/5  4-/5  Elbow ext:  5/5  4-/5    :    R   L     TBA   TBA         Mechanical Assessment: 8/10 paraesthesias into LUE   Repeated retraction:1x10 = inc/NW  Repeated L sidebend: 1x10 dec/NB       Flexibility: min jordan in [place] flexibility              Precautions:   Past Medical History:   Diagnosis Date    COPD (chronic obstructive pulmonary disease) ([facility])     Disease of thyroid gland     graves disease    Rheumatoid arthritis ([facility])      Past Surgical History:   Procedure Laterality Date    FOOT SURGERY Left     KNEE SURGERY      SHOULDER SURGERY       SOC: 2024  FOTO: 2024  POC  Expiration: 2/18/204  Daily Treatment Log:  Date 11/26/2024       Visit # 1       Auth         Auth exp        Manual        Manual traction                 There Exer        Corner stretch         B/L shoulder ext         Tricep press         Bicep curls         Prone T         Prone I                 HEP        There Activ                                                        NMReed        Repeated L side bending         Ulnar N glide (swan stop sign)         Medicna N glide (tray)                 ULTT  Test this session       Modalities                                HEP:   Access Code: [email or web address]  URL: [email or web address]  Date: 11/26/2024  Prepared by: Lisbet Bowers    Exercises  - Seated Cervical Sidebending AROM  - 5 x daily - 7 x weekly - 1 sets - 10 reps

## 2024-11-27 ENCOUNTER — TELEPHONE (OUTPATIENT)
Dept: PAIN MEDICINE | Facility: CLINIC | Age: 59
End: 2024-11-27

## 2024-11-28 RX ORDER — GABAPENTIN 300 MG/1
300 CAPSULE ORAL 3 TIMES DAILY
Qty: 90 CAPSULE | Refills: 1 | Status: SHIPPED | OUTPATIENT
Start: 2024-11-28 | End: 2024-12-04

## 2024-11-29 ENCOUNTER — OFFICE VISIT (OUTPATIENT)
Dept: PHYSICAL THERAPY | Facility: CLINIC | Age: 59
End: 2024-11-29
Payer: COMMERCIAL

## 2024-11-29 DIAGNOSIS — M54.12 LEFT CERVICAL RADICULOPATHY: Primary | ICD-10-CM

## 2024-11-29 PROCEDURE — 97110 THERAPEUTIC EXERCISES: CPT

## 2024-11-29 PROCEDURE — 97112 NEUROMUSCULAR REEDUCATION: CPT

## 2024-11-29 NOTE — PROGRESS NOTES
"Daily Note     Today's date: 2024  Patient name: Basil Molina  : 1965  MRN: [id number]  Referring provider: Carroll Silva MD  Dx:   Encounter Diagnosis     ICD-10-CM    1. Left cervical radiculopathy  M54.12                      Subjective: Patient reports the L side bending increased symptoms so he did not continue past a few sets of the day on Wednesday. Patient currently has pain into forearm and lateral upper arm with numbness into L hand. Patient noted today feels a bit worse than normal. Patient feels since the start of symptoms he has noted they just continue to get worse. Patient would like to get back to work as soon as possible to be able to retain healthcare. Patient follows up with neurosurgeon on 12/10 and is hoping to be seen sooner.        Objective: See treatment diary below      Mechanical assessment: 8/10 pain in LUE     R sidebend: 1x10 - dec/NB in hand     Supine Manual traction: 15\" x3 abolished in shoulder and forearm with slight improvement in numbness on middle finger (difficulty relaxing into PT hands)     Trialed traction in sitting: inc/nw      ULTT: high symptoms irritability   Median: pos on L   Radial: pos on L   Ulnar: pos on L       Assessment: Tolerated treatment well with improvement in symptoms following manual traction in supine. Will continue with this along with strengthening for UE as tolerated.  Patient would benefit from continued PT    Plan: Continue per plan of care.      Precautions:   Past Medical History:   Diagnosis Date    COPD (chronic obstructive pulmonary disease) ([facility])     Disease of thyroid gland     graves disease    Rheumatoid arthritis ([facility])      Past Surgical History:   Procedure Laterality Date    FOOT SURGERY Left     KNEE SURGERY      SHOULDER SURGERY       SOC: 2024  FOTO: 2024  POC Expiration:   Daily Treatment Log:  Date 2024      Visit # 1 2      Auth         Auth exp        Manual  5'    "   Manual traction   EG - abolished symptoms       PA mobs         There Exer  10'      Corner stretch         B/L shoulder ext         Tricep press   Grn tubing 1x10       Bicep curls   5# DB 1x10       Prone T         Prone I         Lat stretch with mat   Trial 1 rep inc/w       HEP        There Activ                                                        NMReed  25'      Repeated R side bending   1x10       Ulnar N glide (swan stop sign)         Median N glide (tray)                                         ULTT  See objective section       Mechanical assessment   EG      Modalities                                HEP:   Access Code: [email or web address]  URL: [email or web address]  Date: 11/26/2024  Prepared by: Lisbet Bowers    Exercises  - Seated Cervical Sidebending AROM  - 5 x daily - 7 x weekly - 1 sets - 10 reps

## 2024-12-02 ENCOUNTER — OFFICE VISIT (OUTPATIENT)
Dept: PHYSICAL THERAPY | Facility: CLINIC | Age: 59
End: 2024-12-02
Payer: COMMERCIAL

## 2024-12-02 DIAGNOSIS — M54.12 LEFT CERVICAL RADICULOPATHY: Primary | ICD-10-CM

## 2024-12-02 PROCEDURE — 97012 MECHANICAL TRACTION THERAPY: CPT

## 2024-12-02 PROCEDURE — 97110 THERAPEUTIC EXERCISES: CPT

## 2024-12-02 NOTE — PROGRESS NOTES
"Daily Note     Today's date: 2024  Patient name: Basil Molina  : 1965  MRN: [id number]  Referring provider: Carroll Silva MD  Dx:   Encounter Diagnosis     ICD-10-CM    1. Left cervical radiculopathy  M54.12                        Subjective: Patient reports he purchased a traction machine to use at home. He has used about 10# for about 10 min which lessened the pain into LUE. Patient had some LBP from being on the harder floor but found switching to a carpeted room improved this a bit. Discussed settings for traction at home and best set up locations. Patient to follow up with neurosurgeon on 24.       Objective: See treatment diary below      Assessment: Tolerated treatment well with improvement in symptoms following mechanical  raction in supine with patients unit. Clinic unit inc/nw.  Will continue with this along with strengthening for UE as tolerated. Patient given written instructions on traction for use at home. Attached copy in chart. Patient would benefit from continued PT    Plan: Continue per plan of care.      Precautions:   Past Medical History:   Diagnosis Date    COPD (chronic obstructive pulmonary disease) (HCC)     Disease of thyroid gland     graves disease    Rheumatoid arthritis (HCC)      Past Surgical History:   Procedure Laterality Date    FOOT SURGERY Left     KNEE SURGERY      SHOULDER SURGERY       SOC: 2024  FOTO: 2024  POC Expiration:   Daily Treatment Log:  Date 2024     Visit # 1 2 3     Auth         Auth exp        Manual  5'      Manual traction   EG - abolished symptoms  See mechanical traction      PA mobs         There Exer  10' 25'     Corner stretch    5\" x5 (low)      B/L shoulder ext    Grn tubing 1x10      Tricep press   Grn tubing 1x10  Grn tubing 1x10      Bicep curls   5# DB 1x10  5# DB 1x10      Prone T         Prone I         Lat stretch with mat   Trial 1 rep inc/w  DC     HEP   Pt edu (see under " mechanical traction)      There Activ                                                        NMReed  25'      Repeated R side bending   1x10       Ulnar N glide (swan stop sign)         Median N glide (tray)                                         ULTT  See objective section       Mechanical assessment   EG      Modalities   15'     Mechanical traction    10#   Start : Slight increase during abolished after getting up (4.5 min     Trial near end Inc/NW at 2 min tawanda.       Patient brought in own unit after stopping clinics traction. No inc with this. Went over settings, height, time to use, and pounds of pressure. (Perf 10min)                     HEP:   Access Code: [email or web address]  URL: [email or web address]  Date: 11/26/2024  Prepared by: Lisbet Bowers    Exercises  - Seated Cervical Sidebending AROM  - 5 x daily - 7 x weekly - 1 sets - 10 reps

## 2024-12-04 ENCOUNTER — OFFICE VISIT (OUTPATIENT)
Dept: NEUROSURGERY | Facility: CLINIC | Age: 59
End: 2024-12-04
Payer: COMMERCIAL

## 2024-12-04 VITALS
DIASTOLIC BLOOD PRESSURE: 77 MMHG | SYSTOLIC BLOOD PRESSURE: 122 MMHG | TEMPERATURE: 98 F | BODY MASS INDEX: 23.98 KG/M2 | WEIGHT: 177 LBS | HEIGHT: 72 IN | HEART RATE: 68 BPM

## 2024-12-04 DIAGNOSIS — M54.12 LEFT CERVICAL RADICULOPATHY: ICD-10-CM

## 2024-12-04 PROCEDURE — 99213 OFFICE O/P EST LOW 20 MIN: CPT | Performed by: NEUROLOGICAL SURGERY

## 2024-12-04 RX ORDER — GABAPENTIN 300 MG/1
600 CAPSULE ORAL 3 TIMES DAILY
Qty: 90 CAPSULE | Refills: 3 | Status: SHIPPED | OUTPATIENT
Start: 2024-12-04

## 2024-12-04 RX ORDER — METHOCARBAMOL 750 MG/1
750 TABLET, FILM COATED ORAL EVERY 6 HOURS PRN
Qty: 30 TABLET | Refills: 3 | Status: SHIPPED | OUTPATIENT
Start: 2024-12-04

## 2024-12-04 NOTE — PROGRESS NOTES
"Name: Basil Molina      : 1965      MRN: [id number]  Encounter Provider: Charly Pearl MD  Encounter Date: 2024   Encounter department: [facility] NEUROSURGICAL [facility]  :  Assessment & Plan        History of Present Illness     Patient is a 59-year-old male with h/o COPD, Graves' disease, RA, tiny (1 mm) pituitary lesion without extension (conservatively managed) who presents with no significant neck pain but mostly focal left forearm pain, radiating proximally intermittently, a/w 3 months of numbness in left 2nd and 3rd digits. No objective weakness, fine motor dysfunction, gait imbalance, saddle anesthesia, bowel/bladder incontinence, recent trauma, prior spinal surgery.    He is a full-time  and needs full control of fine motor function in bilateral hands, so these symptoms are concerning to him.    Of note, he was recently evaluated by orthopedic surgeon Dr. Carroll Silva on 24: “Given that the patient has failed multiple nonoperative treatment modalities and has significant weakness in his left upper extremity along with numbness, inability to work, we discussed the role of surgical intervention.  We discussed ACDF… He is interested in pursuing a posterior based procedure which is not recommended at this time given degree of ventral pathology.\"    Underwent left shoulder bursa injection by orthopedic surgery at [facility] as well as ITZEL without relief.    Remains on gabapentin (prescribed 300 mg TID but actually taking QID) and NSAIDs. Tried PO steroids.    Not on AC/AP.  Non-smoker.    Working with PT.    MRI on 10/30/2024 demonstrated mild progression (compared to prior MRI in ) of left > right disc herniation at C6/7, no significant cord compression or abnormal cord signal changes.    No clinical or radiographic evidence of myelopathy.    I also discussed with Basil MRI findings and natural progression of chronic degenerative disease involving cervical spine. " "Typically, I do not recommend urgent or emergent neurosurgical intervention without evidence of spinal cord compression or cervical myelopathy.      If degenerative pathology is limited to 1 level, as in this case, I recommend anterior approach given less associated morbidities compared to posterior approach (he was previously recommended posterior foraminotomy by another spine surgeon, which I explained is an option but it may impose risk of developing kyphosis requiring posterior instrumented fusion in the future).     Anterior approach is also associated with potential risks and complications including but not limited to hemorrhage, spinal cord injury, new neurologic deficits (including new C5 palsy), voice changes, swallowing difficulties, wound related issues, adjacent level disease, pseudoarthrosis, redo surgery.      After an extensive discussion, he requests to increase Neurontin to 600 mg TID with Robaxin PRN (for forearm \"cramps\") and return in 1 month to discuss further my surgical recommendation (C6/7 ACDF).    All questions and concerns were addressed during this visit.    HPI  Review of Systems   Constitutional: Negative.    HENT: Negative.     Eyes: Negative.    Respiratory: Negative.     Cardiovascular: Negative.    Gastrointestinal: Negative.    Endocrine: Negative.    Genitourinary: Negative.    Musculoskeletal:  Positive for myalgias.        Basil is in room 2  Cervical Radiculopathy-LUE EMG on 10/14/24 @ [facility]   C/S MRI on 10/30/24 @ [facility]  (L) shoulder MRI on 10/23/24 @ [facility]  Neurology @ [facility] for cubital Tunnel  H/o Rheumatoid artthritis  Patient denies any neck pain, only c/o  pain on left shoulder, arm, hand.   It began after arm being pulled when walking his dog in May 2024 and was made worse when playing golf  2 months ago     + N/T/W  on left arm, hand and pointer and middle finger, loss of --He is right handed   Denies pain, N/T/W on RUE  + muscle spasms across b/l shoulders- at " "times  Denies any Decrease ROM ,  Denies any B/B issues    \"Ortho did offer neck surgery but  he refused because he has no neck pain\"     Pain 5/10- constant, throbbing, shooting   Meds: Gabapentin, mild relief   Aleve provides moderates relief   (L) Bursa injection  by Ortho @ [facility]--no relief, instead made pain worse   PT/  PM  No AC/ former smoker/no previous neck sx     Skin: Negative.    Allergic/Immunologic: Negative.    Neurological:  Positive for weakness and numbness.   Hematological: Negative.    Psychiatric/Behavioral:  Positive for sleep disturbance (due to pain).    All other systems reviewed and are negative.   I have personally reviewed the MA's review of systems and made changes as necessary.       Objective   /77   Pulse 68   Temp 98 °F (36.7 °C)   Ht 6' (1.829 m)   Wt 80.3 kg (177 lb)   BMI 24.01 kg/m²     Physical Exam  Vitals and nursing note reviewed.   Constitutional:       Appearance: Normal appearance. He is normal weight.   HENT:      Head: Normocephalic and atraumatic.   Eyes:      Extraocular Movements: Extraocular movements intact.      Pupils: Pupils are equal, round, and reactive to light.   Cardiovascular:      Rate and Rhythm: Normal rate and regular rhythm.      Pulses: Normal pulses.      Heart sounds: Normal heart sounds.   Pulmonary:      Effort: Pulmonary effort is normal.      Breath sounds: Normal breath sounds.   Abdominal:      General: Abdomen is flat.      Palpations: Abdomen is soft.   Musculoskeletal:         General: Normal range of motion.      Cervical back: Normal range of motion.   Neurological:      General: No focal deficit present.      Mental Status: He is alert and oriented to person, place, and time. Mental status is at baseline.      GCS: GCS eye subscore is 4. GCS verbal subscore is 5. GCS motor subscore is 6.      Cranial Nerves: Cranial nerves 2-12 are intact.      Sensory: Sensation is intact.      Motor: Motor strength is normal.Motor function is " intact.      Coordination: Coordination is intact.      Gait: Gait is intact.      Deep Tendon Reflexes: Reflexes are normal and symmetric.   Psychiatric:         Mood and Affect: Mood normal.         Speech: Speech normal.         Behavior: Behavior normal.       Neurologic Exam     Mental Status   Oriented to person, place, and time.   Attention: normal. Concentration: normal.   Speech: speech is normal   Level of consciousness: alert    Cranial Nerves   Cranial nerves II through XII intact.     CN III, IV, VI   Pupils are equal, round, and reactive to light.    Motor Exam     Strength   Strength 5/5 throughout.     Sensory Exam   Near complete numbness in left 2nd and 3rd digits for 3 months     Gait, Coordination, and Reflexes     Gait  Gait: normal    Reflexes   Reflexes 2+ except as noted. No long tract signs       SL AMB Radiology Results Review: I have reviewed the following images/report studies in outside CD: MRI cervical spine    Administrative Statements   I have spent a total time of 45 minutes in caring for this patient on the day of the visit/encounter including Diagnostic results, Prognosis, Risks and benefits of tx options, Instructions for management, Patient and family education, Importance of tx compliance, Risk factor reductions, Impressions, Counseling / Coordination of care, Documenting in the medical record, Reviewing / ordering tests, medicine, procedures  , Obtaining or reviewing history  , and Communicating with other healthcare professionals . Topics discussed with the patient / family include symptom assessment and management, medication review, medication adjustment, psychosocial support, goals of care, supportive listening, and anticipatory guidance.

## 2024-12-05 LAB
SL AMB THYROGLOBULIN AUTOAB: <1 IU/ML
T4 FREE SERPL-MCNC: 0.97 NG/DL (ref 0.61–1.12)
THYROGLOB AB SERPL-ACNC: 0.89 NG/ML (ref 1.59–50.03)
THYROPEROXIDASE AB SERPL-ACNC: 2 IU/ML
TSH SERPL-ACNC: 2.26 UIU/ML (ref 0.45–5.33)

## 2024-12-07 ENCOUNTER — RESULTS FOLLOW-UP (OUTPATIENT)
Dept: ENDOCRINOLOGY | Facility: CLINIC | Age: 59
End: 2024-12-07

## 2024-12-07 LAB — TSI ACT/NOR SER: <0.1 %

## 2024-12-23 ENCOUNTER — OFFICE VISIT (OUTPATIENT)
Dept: PAIN MEDICINE | Facility: CLINIC | Age: 59
End: 2024-12-23
Payer: COMMERCIAL

## 2024-12-23 VITALS — HEIGHT: 72 IN | BODY MASS INDEX: 23.98 KG/M2 | WEIGHT: 177 LBS

## 2024-12-23 DIAGNOSIS — M54.12 CERVICAL RADICULOPATHY: Primary | ICD-10-CM

## 2024-12-23 PROCEDURE — 99213 OFFICE O/P EST LOW 20 MIN: CPT | Performed by: ANESTHESIOLOGY

## 2024-12-23 NOTE — PROGRESS NOTES
Assessment:  1. Cervical radiculopathy      Patient presenting for follow-up visit.  He has a history of ongoing left cervical radiculopathy for 3+ months.  Pain is consistent with cervical radiculopathy, accompanied by pain 8/10 on the pain scale with inability to participate in IADLs for >6 weeks. Patient has tried diclofenac with no benefit, gabapentin with modest benefit. Denies any bowel or bladder incontinence, saddle anesthesia.     Independently reviewed and interpreted MRI cervical spine - this showed a left sided disc herniation at C6-7 with severe left foraminal narrowing.     After left C7-T1 ITZEL, he reports no significant improvement.    He recently saw orthopedic surgery and neurosurgery and is considering ACDF.  Dr. Parks did recently increase his gabapentin to 600 mg 3 times daily as well as taking ibuprofen and Robaxin as needed.  Overall he does note 50 to 70% improvement with the recent gabapentin increase.    Plan:     Patient does want to proceed with ACDF, but this depends on his disability status during the perioperative state.    I did let the patient know we can attempt another cervical epidural steroid injection if he is not able to schedule surgery in the near future.  Would offer repeat ITZEL with left C6-7 interlaminar approach.    Recommended to continue with his gabapentin, Robaxin and NSAID regimen in the meantime.  He will contact our office if he does decide to proceed with repeat FRANCESCA with left C6-7 approach.    Reviewed notes from orthopedic surgery, neurosurgery offices.    My impressions and treatment recommendations were discussed in detail with the patient who verbalized understanding and had no further questions.  Discharge instructions were provided. I personally saw and examined the patient and I agree with the above discussed plan of care.      History of Present Illness:  Basil Molina is a 59 y.o. male who presents for a follow up office visit in regards to Neck Pain (FU  (N) 4 wk post LT C7-T1 ITZEL/Antwon. 2 tab 3 times a day ).   The patient’s current symptoms include continued left radiating arm pain symptoms.  Pain is currently rated 8 out of 10 and described as an nearly constant cramping, shooting pain worse in the evening.    I have personally reviewed and/or updated the patient's past medical history, past surgical history, family history, social history, current medications, allergies, and vital signs today.     Review of Systems   Respiratory:  Negative for shortness of breath.    Cardiovascular:  Negative for chest pain.   Gastrointestinal:  Negative for constipation, diarrhea, nausea and vomiting.   Musculoskeletal:  Negative for arthralgias, gait problem, joint swelling and myalgias.   Skin:  Negative for rash.   Neurological:  Negative for dizziness, seizures and weakness.   All other systems reviewed and are negative.      Patient Active Problem List   Diagnosis    Postablative hypothyroidism    Graves disease    Pituitary microadenoma (HCC)    Muscle rigidity    Cervical radiculopathy       Past Medical History:   Diagnosis Date    COPD (chronic obstructive pulmonary disease) (HCC)     Disease of thyroid gland     graves disease    Rheumatoid arthritis (HCC)        Past Surgical History:   Procedure Laterality Date    FOOT SURGERY Left     KNEE SURGERY      SHOULDER SURGERY         Family History   Problem Relation Age of Onset    Parkinsonism Mother     Diverticulitis Father        Social History     Occupational History    Not on file   Tobacco Use    Smoking status: Former    Smokeless tobacco: Never   Vaping Use    Vaping status: Never Used   Substance and Sexual Activity    Alcohol use: Yes     Comment: Occasionally    Drug use: Not Currently    Sexual activity: Not on file       Current Outpatient Medications on File Prior to Visit   Medication Sig    albuterol (PROVENTIL HFA,VENTOLIN HFA) 90 mcg/act inhaler Inhale 2 puffs every 6 (six) hours as needed for wheezing     budesonide-formoterol (Symbicort) 160-4.5 mcg/act inhaler Inhale 2 puffs in the morning    fluticasone (FLONASE) 50 mcg/act nasal spray 1 spray into each nostril daily    gabapentin (Neurontin) 300 mg capsule Take 2 capsules (600 mg total) by mouth 3 (three) times a day    hydroxychloroquine (PLAQUENIL) 200 mg tablet Take 200 mg by mouth 2 (two) times a day    levothyroxine 125 mcg tablet Take 125 mcg by mouth daily    methocarbamol (Robaxin-750) 750 mg tablet Take 1 tablet (750 mg total) by mouth every 6 (six) hours as needed for muscle spasms (arm cramps)    Omeprazole (PRILOSEC PO) Take 20 mg by mouth daily    ARIPiprazole (ABILIFY) 2 mg tablet Take 2 mg by mouth every morning (Patient not taking: Reported on 12/23/2024)    doxycycline hyclate (VIBRA-TABS) 100 mg tablet Take 100 mg by mouth 2 (two) times a day (Patient not taking: Reported on 11/16/2023)    escitalopram (LEXAPRO) 10 mg tablet Take 10 mg by mouth daily (Patient not taking: Reported on 9/18/2024)    levocetirizine (XYZAL) 5 MG tablet Take 5 mg by mouth daily at bedtime as needed (Patient not taking: Reported on 5/23/2024)    meloxicam (MOBIC) 15 mg tablet Take 15 mg by mouth if needed (Patient not taking: Reported on 11/16/2023)    montelukast (SINGULAIR) 10 mg tablet Take 10 mg by mouth daily at bedtime as needed (Patient not taking: Reported on 12/4/2024)    ondansetron (Zofran ODT) 4 mg disintegrating tablet Take 1 tablet (4 mg total) by mouth every 8 (eight) hours as needed for nausea for up to 15 doses (Patient not taking: Reported on 10/9/2023)    oxyCODONE-acetaminophen (PERCOCET) 5-325 mg per tablet Take 1 tablet by mouth every 4 (four) hours as needed for moderate pain Max Daily Amount: 6 tablets (Patient not taking: Reported on 12/23/2024)    predniSONE 10 mg tablet Take 1 tablet (10 mg total) by mouth if needed (Joint pains) (Patient not taking: Reported on 12/23/2024)     No current facility-administered medications on file prior to  visit.       Allergies   Allergen Reactions    Aminophylline Other (See Comments)    Pollen Extract Other (See Comments)    Theophyllines        Physical Exam:    Ht 6' (1.829 m)   Wt 80.3 kg (177 lb)   BMI 24.01 kg/m²     Constitutional:normal, well developed, well nourished, alert, in no distress and non-toxic and no overt pain behavior.  Eyes:anicteric  HEENT:grossly intact  Neck:supple, symmetric, trachea midline and no masses   Pulmonary:even and unlabored  Cardiovascular:No edema or pitting edema present  Skin:Normal without rashes or lesions and well hydrated  Psychiatric:Mood and affect appropriate  Neurologic: Motor function is grossly intact with no focal neurologic deficits   Musculoskeletal: Positive left Spurling's    Imaging

## 2024-12-30 ENCOUNTER — TELEPHONE (OUTPATIENT)
Dept: PHYSICAL THERAPY | Facility: CLINIC | Age: 59
End: 2024-12-30

## 2024-12-30 NOTE — TELEPHONE ENCOUNTER
PT called patient to check in -  last appt was on 12/2/24 with patient following up with neurosurgery and pain management. PT left message with request for call back on plan to continue or DC from PT.     Lisbet Bowers PT, DPT   License #  [id number]

## 2025-01-07 ENCOUNTER — OFFICE VISIT (OUTPATIENT)
Dept: NEUROSURGERY | Facility: CLINIC | Age: 60
End: 2025-01-07
Payer: COMMERCIAL

## 2025-01-07 ENCOUNTER — APPOINTMENT (OUTPATIENT)
Dept: LAB | Facility: CLINIC | Age: 60
End: 2025-01-07
Payer: COMMERCIAL

## 2025-01-07 ENCOUNTER — TELEPHONE (OUTPATIENT)
Age: 60
End: 2025-01-07

## 2025-01-07 VITALS
OXYGEN SATURATION: 98 % | TEMPERATURE: 97.6 F | SYSTOLIC BLOOD PRESSURE: 122 MMHG | WEIGHT: 177 LBS | HEIGHT: 72 IN | BODY MASS INDEX: 23.98 KG/M2 | HEART RATE: 68 BPM | DIASTOLIC BLOOD PRESSURE: 72 MMHG

## 2025-01-07 DIAGNOSIS — M54.12 LEFT CERVICAL RADICULOPATHY: ICD-10-CM

## 2025-01-07 DIAGNOSIS — M48.02 CERVICAL STENOSIS OF SPINAL CANAL: ICD-10-CM

## 2025-01-07 DIAGNOSIS — E53.8 B12 DEFICIENCY: ICD-10-CM

## 2025-01-07 DIAGNOSIS — E55.9 VITAMIN D DEFICIENCY: ICD-10-CM

## 2025-01-07 DIAGNOSIS — M05.9 SEROPOSITIVE RHEUMATOID ARTHRITIS (HCC): ICD-10-CM

## 2025-01-07 DIAGNOSIS — Z11.59 NEED FOR HEPATITIS C SCREENING TEST: ICD-10-CM

## 2025-01-07 DIAGNOSIS — M48.02 CERVICAL STENOSIS OF SPINAL CANAL: Primary | ICD-10-CM

## 2025-01-07 DIAGNOSIS — Z79.899 LONG-TERM USE OF PLAQUENIL: ICD-10-CM

## 2025-01-07 LAB
BILIRUB UR QL STRIP: NEGATIVE
CLARITY UR: CLEAR
COLOR UR: NORMAL
GLUCOSE UR STRIP-MCNC: NEGATIVE MG/DL
HGB UR QL STRIP.AUTO: NEGATIVE
KETONES UR STRIP-MCNC: NEGATIVE MG/DL
LEUKOCYTE ESTERASE UR QL STRIP: NEGATIVE
NITRITE UR QL STRIP: NEGATIVE
PH UR STRIP.AUTO: 6.5 [PH]
PROT UR STRIP-MCNC: NEGATIVE MG/DL
SP GR UR STRIP.AUTO: 1.01 (ref 1–1.03)
UROBILINOGEN UR STRIP-ACNC: <2 MG/DL

## 2025-01-07 PROCEDURE — 81003 URINALYSIS AUTO W/O SCOPE: CPT

## 2025-01-07 PROCEDURE — 99215 OFFICE O/P EST HI 40 MIN: CPT | Performed by: NEUROLOGICAL SURGERY

## 2025-01-07 PROCEDURE — 87081 CULTURE SCREEN ONLY: CPT

## 2025-01-07 RX ORDER — CEFAZOLIN SODIUM 2 G/50ML
2000 SOLUTION INTRAVENOUS ONCE
OUTPATIENT
Start: 2025-01-07 | End: 2025-01-07

## 2025-01-07 NOTE — PROGRESS NOTES
"Name: Basil Molina      : 1965      MRN: [id number]  Encounter Provider: Charly Pearl MD  Encounter Date: 2025   Encounter department: [facility] NEUROS[facility]  :  Assessment & Plan        History of Present Illness     Patient was previously evaluated on 24. He is concerned about worsening symptoms.    He is a 59-year-old male with h/o COPD, Graves' disease, RA, tiny (1 mm) pituitary lesion without extension (conservatively managed) who presents with neck pain radiating to left forearm and hand in C6 and C7 distributions a/w worsening numbness in left 2nd and 3rd digits as well as \"loss of \" and fine motor function, which impairs him at work as  (\"I don't feel safe anymore\"). No gait imbalance, saddle anesthesia, bowel/bladder incontinence, recent trauma, prior spinal surgery.    Of note, he was recently evaluated by orthopedic surgeon Dr. Carroll Silva on 24: “Given that the patient has failed multiple nonoperative treatment modalities and has significant weakness in his left upper extremity along with numbness, inability to work, we discussed the role of surgical intervention. We discussed ACDF… He is interested in pursuing a posterior based procedure which is not recommended at this time given degree of ventral pathology.\"     Underwent left shoulder bursa injection by orthopedic surgery at [facility] as well as [facility] without relief.     Remains on Neurontin 600 mg q6h with mild improvement (\"I can at least sleep through the night but when the medicine wears off the pain is so bad\").    Not on AC/AP. Non-smoker.     Working with PT.     MRI on 10/30/2024 demonstrated mild progression (compared to prior MRI in ) of left > right disc herniation at C6/7, no significant cord compression or abnormal cord signal changes.     I discussed with Basil MRI findings and natural progression of chronic degenerative disease involving cervical spine. Typically, I do not " "recommend urgent or emergent neurosurgical intervention without evidence of spinal cord compression or cervical myelopathy.      If degenerative pathology is limited to 1 level, as in this case, I recommend anterior approach given less associated morbidities compared to posterior approach (he was previously recommended posterior foraminotomy by another spine surgeon, which I explained is an option but it may impose risk of developing kyphosis requiring posterior instrumented fusion in the future).      Anterior approach is also associated with potential risks and complications including but not limited to hemorrhage, spinal cord injury, new neurologic deficits (including new C5 palsy), voice changes, swallowing difficulties, wound related issues, adjacent level disease, pseudoarthrosis, redo surgery.      Given failed symptomatic improvement with increased medications (Neurontin to 600 mg TID with Robaxin PRN for forearm \"cramps\"), we will proceed with C6/7 ACDF as previously discussed. He signs consent today.    All questions and concerns were addressed during this visit.    HPI  Review of Systems   Constitutional: Negative.    HENT: Negative.     Eyes: Negative.    Respiratory: Negative.     Cardiovascular: Negative.    Gastrointestinal: Negative.    Endocrine: Negative.    Genitourinary: Negative.  Negative for frequency and urgency.   Musculoskeletal:  Positive for neck pain. Negative for gait problem and neck stiffness.        1 mo F/u re- discuss sx options  Cervical radiculopathy     5/10- constant, throbbing, shooting Left forearm pain  N in left  hand, 2nd and 3rd digits - loss of      Meds- gabapentin to 600 mg TID (50 to 70% improvement since increase), ibuprofen and Robaxin 750 mg  PRN     PM- C7-T1 Interlaminar FRANCESCA on 11/14/24 no improvement  PT- 12/2/24     Skin: Negative.    Allergic/Immunologic: Negative.    Neurological:  Positive for tremors (left hand), weakness and numbness.   Hematological: " Negative.    Psychiatric/Behavioral:  Positive for sleep disturbance.    All other systems reviewed and are negative.   I have personally reviewed the MA's review of systems and made changes as necessary.       Objective   /72 (Patient Position: Sitting, Cuff Size: Standard)   Pulse 68   Temp 97.6 °F (36.4 °C) (Temporal)   Ht 6' (1.829 m)   Wt 80.3 kg (177 lb)   SpO2 98%   BMI 24.01 kg/m²     Physical Exam  Vitals and nursing note reviewed.   Constitutional:       Appearance: Normal appearance. He is normal weight.   HENT:      Head: Normocephalic and atraumatic.   Eyes:      Extraocular Movements: Extraocular movements intact.      Pupils: Pupils are equal, round, and reactive to light.   Cardiovascular:      Rate and Rhythm: Normal rate and regular rhythm.      Pulses: Normal pulses.      Heart sounds: Normal heart sounds.   Pulmonary:      Effort: Pulmonary effort is normal.      Breath sounds: Normal breath sounds.   Abdominal:      General: Abdomen is flat.      Palpations: Abdomen is soft.   Musculoskeletal:         General: Normal range of motion.      Cervical back: Normal range of motion.   Neurological:      General: No focal deficit present.      Mental Status: He is alert and oriented to person, place, and time. Mental status is at baseline.      GCS: GCS eye subscore is 4. GCS verbal subscore is 5. GCS motor subscore is 6.      Cranial Nerves: Cranial nerves 2-12 are intact.      Sensory: Sensation is intact.      Motor: Motor function is intact.      Coordination: Coordination is intact.      Gait: Gait is intact.      Deep Tendon Reflexes: Reflexes are normal and symmetric.   Psychiatric:         Mood and Affect: Mood normal.         Speech: Speech normal.         Behavior: Behavior normal.       Neurologic Exam     Mental Status   Oriented to person, place, and time.   Attention: normal. Concentration: normal.   Speech: speech is normal   Level of consciousness: alert    Cranial Nerves    Cranial nerves II through XII intact.     CN III, IV, VI   Pupils are equal, round, and reactive to light.    Motor Exam     Strength   Strength 5/5 except as noted. L>R hand  weakness     Sensory Exam   L>R hand numbness in C6 and C7 distributions     Gait, Coordination, and Reflexes     Gait  Gait: normal    Reflexes   Reflexes 2+ except as noted.       SL AMB Radiology Results Review: I have reviewed the following images/report studies in PACS: MRI    Administrative Statements   I have spent a total time of 30 minutes in caring for this patient on the day of the visit/encounter including Diagnostic results, Prognosis, Risks and benefits of tx options, Instructions for management, Patient and family education, Importance of tx compliance, Risk factor reductions, Impressions, Counseling / Coordination of care, Documenting in the medical record, Reviewing / ordering tests, medicine, procedures  , Obtaining or reviewing history  , and Communicating with other healthcare professionals . Topics discussed with the patient / family include symptom assessment and management, medication review, medication adjustment, goals of care, supportive listening, and anticipatory guidance.

## 2025-01-08 LAB — MRSA NOSE QL CULT: NORMAL

## 2025-01-10 LAB
ATRIAL RATE: 58 BPM
P AXIS: 113 DEGREES
PR INTERVAL: 150 MS
QRS AXIS: 13 DEGREES
QRSD INTERVAL: 102 MS
QT INTERVAL: 418 MS
QTC INTERVAL: 411 MS
T WAVE AXIS: 52 DEGREES
VENTRICULAR RATE: 58 BPM

## 2025-01-10 PROCEDURE — 93010 ELECTROCARDIOGRAM REPORT: CPT | Performed by: INTERNAL MEDICINE

## 2025-02-28 ENCOUNTER — TELEPHONE (OUTPATIENT)
Dept: RHEUMATOLOGY | Facility: CLINIC | Age: 60
End: 2025-02-28

## 2025-02-28 DIAGNOSIS — M05.9 SEROPOSITIVE RHEUMATOID ARTHRITIS (HCC): ICD-10-CM

## 2025-02-28 RX ORDER — PREDNISONE 10 MG/1
10 TABLET ORAL AS NEEDED
Qty: 30 TABLET | Refills: 0 | Status: SHIPPED | OUTPATIENT
Start: 2025-02-28

## 2025-03-06 ENCOUNTER — TELEPHONE (OUTPATIENT)
Dept: NEUROSURGERY | Facility: CLINIC | Age: 60
End: 2025-03-06

## 2025-03-06 NOTE — TELEPHONE ENCOUNTER
Patient came in the office today to see dr Dr Pearl after being r/s from yesterday per Dr Pearl. Unfortunately, his [employer] insurance is coming back inactive at this time. Patient was upset that it should have been figured out for today when he came in. After discussion patient explained he had worked enough hours for his company to pay to keep his insurance active. I explained sometimes there is a lag between when the employer pays the premium and when the insurance becomes active. He asked why he has an approval at home for surgery that is still active but we can't figure out his insurance. I asked explained the auth was received while the insurance was still showing active. Again patient was not happy and said to call him to reschedule once we got it straightened out. We will rerun his insurance on Monday and see if it is active again.

## 2025-03-31 ENCOUNTER — TELEPHONE (OUTPATIENT)
Age: 60
End: 2025-03-31

## 2025-03-31 NOTE — TELEPHONE ENCOUNTER
Patient called to schedule follow up appointment. He stated he prefers afternoons but the first afternoon time was not until June. Patient was upset and stated he may have to find another provider that has availability. I offered patient 4/14/25 at 10:00 am and he accepted but asked to see if Dr Reyes could accommodate an afternoon time. Patient declined virtual visit also. Patient requested a call back with update on an afternoon time slot. Please advise.

## 2025-04-05 LAB
HBA1C MFR BLD HPLC: 6 %
HCV AB SER-ACNC: NONREACTIVE

## 2025-04-13 PROBLEM — Z79.899 LONG-TERM USE OF PLAQUENIL: Status: ACTIVE | Noted: 2025-04-13

## 2025-04-13 PROBLEM — M05.9 SEROPOSITIVE RHEUMATOID ARTHRITIS (HCC): Status: ACTIVE | Noted: 2025-04-13

## 2025-04-13 PROBLEM — M15.0 PRIMARY GENERALIZED (OSTEO)ARTHRITIS: Status: ACTIVE | Noted: 2025-04-13

## 2025-04-14 ENCOUNTER — OFFICE VISIT (OUTPATIENT)
Dept: RHEUMATOLOGY | Facility: CLINIC | Age: 60
End: 2025-04-14
Payer: COMMERCIAL

## 2025-04-14 VITALS
BODY MASS INDEX: 23.16 KG/M2 | SYSTOLIC BLOOD PRESSURE: 114 MMHG | WEIGHT: 171 LBS | HEART RATE: 64 BPM | OXYGEN SATURATION: 99 % | HEIGHT: 72 IN | DIASTOLIC BLOOD PRESSURE: 60 MMHG

## 2025-04-14 DIAGNOSIS — M15.0 PRIMARY GENERALIZED (OSTEO)ARTHRITIS: ICD-10-CM

## 2025-04-14 DIAGNOSIS — M05.9 SEROPOSITIVE RHEUMATOID ARTHRITIS (HCC): Primary | ICD-10-CM

## 2025-04-14 DIAGNOSIS — Z79.899 LONG-TERM USE OF PLAQUENIL: ICD-10-CM

## 2025-04-14 PROCEDURE — 99214 OFFICE O/P EST MOD 30 MIN: CPT | Performed by: INTERNAL MEDICINE

## 2025-04-14 RX ORDER — HYDROXYCHLOROQUINE SULFATE 200 MG/1
200 TABLET, FILM COATED ORAL 2 TIMES DAILY
Qty: 180 TABLET | Refills: 3 | Status: SHIPPED | OUTPATIENT
Start: 2025-04-14 | End: 2026-04-14

## 2025-04-14 RX ORDER — PREDNISONE 10 MG/1
10 TABLET ORAL AS NEEDED
Qty: 90 TABLET | Refills: 0 | Status: SHIPPED | OUTPATIENT
Start: 2025-04-14

## 2025-04-14 NOTE — ASSESSMENT & PLAN NOTE
- He presents for a follow-up of seropositive rheumatoid arthritis [anti-CCP positive] for which he is currently on hydroxychloroquine 200 mg twice daily and reports overall stability.  In view of this the hydroxychloroquine will be continued unchanged and he may take prednisone 10 mg once a day as needed on an infrequent basis.  He will update high risk medication lab monitoring to assess for drug toxicities prior to the follow-up visit and see ophthalmology on an annual basis.    Orders:    C-reactive protein; Future    Sedimentation rate, automated; Future    hydroxychloroquine (PLAQUENIL) 200 mg tablet; Take 1 tablet (200 mg total) by mouth 2 (two) times a day    predniSONE 10 mg tablet; Take 1 tablet (10 mg total) by mouth if needed (Joint pains)

## 2025-04-14 NOTE — PROGRESS NOTES
Name: Basil Molina      : 1965      MRN: [id number]  Encounter Provider: Mirlande Reyes MD  Encounter Date: 2025   Encounter department: [facility]  :  Assessment & Plan  Seropositive rheumatoid arthritis (HCC)  - He presents for a follow-up of seropositive rheumatoid arthritis [anti-CCP positive] for which he is currently on hydroxychloroquine 200 mg twice daily and reports overall stability.  In view of this the hydroxychloroquine will be continued unchanged and he may take prednisone 10 mg once a day as needed on an infrequent basis.  He will update high risk medication lab monitoring to assess for drug toxicities prior to the follow-up visit and see ophthalmology on an annual basis.    Orders:    C-reactive protein; Future    Sedimentation rate, automated; Future    hydroxychloroquine (PLAQUENIL) 200 mg tablet; Take 1 tablet (200 mg total) by mouth 2 (two) times a day    predniSONE 10 mg tablet; Take 1 tablet (10 mg total) by mouth if needed (Joint pains)    Long-term use of Plaquenil    Orders:    CBC and differential; Future    Comprehensive metabolic panel; Future    Primary generalized (osteo)arthritis  - He has been taking Advil 600 mg once a day as needed.           Patient's rheumatologic disease(s) threaten long-term function if not appropriately managed.      History of Present Illness   HPI    INITIAL VISIT NOTE BY DR. RICO (2024):  Basil Molina is a 59 y.o. male with pmh Lyme disease (was on doxycycline) and presents for f/u of RA with + CCP.     Patient used to follow Dr. Parks. He was taking  mg BID and prednisone 10 mg prn for flare ups (which he ran out of months ago). His last visit was in February and he had no tender or swollen joints at that time.      Currently hands, feet and knees are most bothersome. He also has lateral epicondylitis in the left elbow and he has a brace that he uses but it has not been helping lately. He  gets swelling in the left knee that is chronic for years (s/p replacement in the past). He is planning to see Orthopedic for this. He does not notice swelling in the hands but the past few weeks he has had morning stiffness for about an hour.       4/14/2025:  Patient presents for a follow-up of seropositive rheumatoid arthritis.  He is currently on hydroxychloroquine 200 mg twice daily.  He did not have a chance to do the hand and foot x-rays.  I reviewed his labs which showed an unremarkable CBC, CMP, ESR, CRP, chronic hepatitis panel and vitamin D.    He reports overall he has been fairly stable.  No acute flareups of the rheumatoid arthritis.  He does experience joint pains that may be activity related and secondary to his occupation as an .  No recent joint swelling.  Occasionally he may experience morning stiffness in his feet that starts to improve gradually with activities.  If required infrequently he will take prednisone 10 mg once daily and this usually helps him well and prevents symptoms from escalating.  As he does experience occupation related joint pains he has also been taking Advil 600 mg once a day as needed which does help him.    He has been tolerating the hydroxychloroquine well and is up-to-date with his annual eye exams.  He is due to schedule the summer.          Review of Systems  Constitutional: Negative for weight change, fevers, chills, night sweats, fatigue.  ENT/Mouth: Negative for hearing changes, ear pain, nasal congestion, sinus pain, hoarseness, sore throat, rhinorrhea, swallowing difficulty.   Eyes: Negative for pain, redness, discharge, vision changes.   Cardiovascular: Negative for chest pain, SOB, palpitations.   Respiratory: Negative for cough, sputum, wheezing, dyspnea.   Gastrointestinal: Negative for nausea, vomiting, diarrhea, constipation, pain, heartburn.  Genitourinary: Negative for dysuria, urinary frequency, hematuria.   Musculoskeletal: As per HPI.  Skin:  Negative for skin rash, color changes.   Neuro: Negative for weakness, numbness, tingling, loss of consciousness.   Psych: Negative for anxiety, depression.   Heme/Lymph: Negative for easy bruising, bleeding, lymphadenopathy.      Past Medical History   Past Medical History:   Diagnosis Date    COPD (chronic obstructive pulmonary disease) (HCC)     Disease of thyroid gland     graves disease    Rheumatoid arthritis (HCC)      Past Surgical History:   Procedure Laterality Date    COLONOSCOPY      FOOT SURGERY Left     JOINT REPLACEMENT Left     KNEE SURGERY      ROTATOR CUFF REPAIR Left     left partial right labrum repair    SHOULDER SURGERY       Family History   Problem Relation Age of Onset    Parkinsonism Mother     Diverticulitis Father       reports that he has quit smoking. He has never used smokeless tobacco. He reports current alcohol use. He reports current drug use.  Current Outpatient Medications   Medication Instructions    albuterol (PROVENTIL HFA,VENTOLIN HFA) 90 mcg/act inhaler 2 puffs, Every 6 hours PRN    ARIPiprazole (ABILIFY) 2 mg, Every morning    budesonide-formoterol (Symbicort) 160-4.5 mcg/act inhaler 2 puffs, Daily    doxycycline hyclate (VIBRA-TABS) 100 mg, 2 times daily    escitalopram (LEXAPRO) 10 mg, Daily    fluticasone (FLONASE) 50 mcg/act nasal spray 1 spray, Daily    gabapentin (NEURONTIN) 600 mg, Oral, 3 times daily    hydroxychloroquine (PLAQUENIL) 200 mg, Oral, 2 times daily    levocetirizine (XYZAL) 5 mg, Daily at bedtime PRN    levothyroxine 125 mcg, Daily    methocarbamol (ROBAXIN-750) 750 mg, Oral, Every 6 hours PRN    montelukast (SINGULAIR) 10 mg, Daily at bedtime PRN    Omeprazole (PRILOSEC PO) 20 mg, Daily    ondansetron (ZOFRAN ODT) 4 mg, Oral, Every 8 hours PRN    oxyCODONE-acetaminophen (PERCOCET) 5-325 mg per tablet 1 tablet, Oral, Every 4 hours PRN    predniSONE 10 mg, Oral, As needed     Allergies   Allergen Reactions    Aminophylline Other (See Comments)     Throat  swelling    Theophyllines Throat Swelling     Throat swelling    Pollen Extract Other (See Comments)     Seasonal allergy runny nose congestion      Current Outpatient Medications on File Prior to Visit   Medication Sig Dispense Refill    albuterol (PROVENTIL HFA,VENTOLIN HFA) 90 mcg/act inhaler Inhale 2 puffs every 6 (six) hours as needed for wheezing      budesonide-formoterol (Symbicort) 160-4.5 mcg/act inhaler Inhale 2 puffs in the morning      fluticasone (FLONASE) 50 mcg/act nasal spray 1 spray into each nostril daily      levothyroxine 125 mcg tablet Take 125 mcg by mouth daily      Omeprazole (PRILOSEC PO) Take 20 mg by mouth daily      [DISCONTINUED] hydroxychloroquine (PLAQUENIL) 200 mg tablet Take 1 tablet (200 mg total) by mouth 2 (two) times a day 180 tablet 1    [DISCONTINUED] predniSONE 10 mg tablet Take 1 tablet (10 mg total) by mouth if needed (Joint pains) 30 tablet 0    ARIPiprazole (ABILIFY) 2 mg tablet Take 2 mg by mouth every morning (Patient not taking: Reported on 4/14/2025)      doxycycline hyclate (VIBRA-TABS) 100 mg tablet Take 100 mg by mouth 2 (two) times a day (Patient not taking: Reported on 11/16/2023)      escitalopram (LEXAPRO) 10 mg tablet Take 10 mg by mouth daily (Patient not taking: Reported on 9/18/2024)      gabapentin (Neurontin) 300 mg capsule Take 2 capsules (600 mg total) by mouth 3 (three) times a day (Patient not taking: Reported on 4/14/2025) 90 capsule 3    levocetirizine (XYZAL) 5 MG tablet Take 5 mg by mouth daily at bedtime as needed (Patient not taking: Reported on 5/23/2024)      methocarbamol (Robaxin-750) 750 mg tablet Take 1 tablet (750 mg total) by mouth every 6 (six) hours as needed for muscle spasms (arm cramps) (Patient not taking: Reported on 4/14/2025) 30 tablet 3    montelukast (SINGULAIR) 10 mg tablet Take 10 mg by mouth daily at bedtime as needed (Patient not taking: Reported on 12/4/2024)      ondansetron (Zofran ODT) 4 mg disintegrating tablet Take 1  tablet (4 mg total) by mouth every 8 (eight) hours as needed for nausea for up to 15 doses (Patient not taking: Reported on 10/9/2023) 15 tablet 0    oxyCODONE-acetaminophen (PERCOCET) 5-325 mg per tablet Take 1 tablet by mouth every 4 (four) hours as needed for moderate pain Max Daily Amount: 6 tablets (Patient not taking: Reported on 12/23/2024) 20 tablet 0    [DISCONTINUED] meloxicam (MOBIC) 15 mg tablet Take 15 mg by mouth if needed (Patient not taking: Reported on 11/16/2023)       No current facility-administered medications on file prior to visit.      Social History     Tobacco Use    Smoking status: Former    Smokeless tobacco: Never   Vaping Use    Vaping status: Never Used   Substance and Sexual Activity    Alcohol use: Yes     Comment: Occasionally    Drug use: Yes     Comment: pt denies useage however in [facility] note for surgery PCP notes marajuana useage and gummies    Sexual activity: Not on file        Objective   /60 (BP Location: Right arm, Patient Position: Sitting, Cuff Size: Adult)   Pulse 64   Ht 6' (1.829 m)   Wt 77.6 kg (171 lb)   SpO2 99%   BMI 23.19 kg/m²      Physical Exam  General: Well appearing, well nourished, in no distress. Oriented x 3, normal mood and affect.  Ambulating without difficulty.  Skin: Good turgor, no rash, unusual bruising or prominent lesions.  Hair: Normal texture and distribution.  HEENT:  Head: Normocephalic, atraumatic.  Eyes: Conjunctiva clear, sclera non-icteric, EOM intact.  Nose: No external lesions.  Neck: Supple.  Extremities: No amputations or deformities, cyanosis, edema.  Musculoskeletal:   Hands -he has minimal fullness at the right hand PIP joints without tenderness.  Otherwise his hands are unremarkable.  Wrists, elbows and shoulders -unremarkable.  Knees, ankles and feet -unremarkable.  He is status post left knee total arthroplasty.  Neurologic: Alert and oriented. No focal neurological deficits appreciated.   Psychiatric: Normal mood and  affect.

## 2025-04-17 ENCOUNTER — TELEPHONE (OUTPATIENT)
Age: 60
End: 2025-04-17

## 2025-04-17 NOTE — TELEPHONE ENCOUNTER
Prednisone Clarification    Advised as per 04/14/25 OV Note:     In view of this the hydroxychloroquine will be continued unchanged and he may take prednisone 10 mg once a day as needed on an infrequent basis.   
Good

## 2025-05-15 ENCOUNTER — PATIENT MESSAGE (OUTPATIENT)
Dept: NEUROSURGERY | Facility: CLINIC | Age: 60
End: 2025-05-15

## 2025-05-23 ENCOUNTER — HOSPITAL ENCOUNTER (OUTPATIENT)
Facility: MEDICAL CENTER | Age: 60
Discharge: HOME | End: 2025-05-23
Payer: COMMERCIAL

## 2025-05-23 DIAGNOSIS — M54.12 LEFT CERVICAL RADICULOPATHY: ICD-10-CM

## 2025-05-23 DIAGNOSIS — E23.7 PITUITARY LESION (HCC): ICD-10-CM

## 2025-05-23 PROCEDURE — A9585 GADOBUTROL INJECTION: HCPCS | Performed by: PHYSICIAN ASSISTANT

## 2025-05-23 PROCEDURE — 70553 MRI BRAIN STEM W/O & W/DYE: CPT

## 2025-05-23 RX ORDER — GADOBUTROL 604.72 MG/ML
7 INJECTION INTRAVENOUS
Status: COMPLETED | OUTPATIENT
Start: 2025-05-23 | End: 2025-05-23

## 2025-05-23 RX ORDER — GABAPENTIN 300 MG/1
600 CAPSULE ORAL 3 TIMES DAILY
Qty: 90 CAPSULE | Refills: 3 | OUTPATIENT
Start: 2025-05-23

## 2025-05-23 RX ADMIN — GADOBUTROL 7 ML: 604.72 INJECTION INTRAVENOUS at 16:45

## 2025-06-06 DIAGNOSIS — M54.12 LEFT CERVICAL RADICULOPATHY: ICD-10-CM

## 2025-06-06 RX ORDER — GABAPENTIN 300 MG/1
600 CAPSULE ORAL 3 TIMES DAILY
Qty: 90 CAPSULE | Refills: 3 | OUTPATIENT
Start: 2025-06-06

## 2025-07-13 DIAGNOSIS — M05.9 SEROPOSITIVE RHEUMATOID ARTHRITIS (HCC): ICD-10-CM

## 2025-07-14 RX ORDER — PREDNISONE 10 MG/1
TABLET ORAL
Qty: 90 TABLET | Refills: 0 | Status: SHIPPED | OUTPATIENT
Start: 2025-07-14